# Patient Record
Sex: MALE | Race: WHITE | NOT HISPANIC OR LATINO | Employment: FULL TIME | URBAN - METROPOLITAN AREA
[De-identification: names, ages, dates, MRNs, and addresses within clinical notes are randomized per-mention and may not be internally consistent; named-entity substitution may affect disease eponyms.]

---

## 2018-10-11 ENCOUNTER — IMMUNIZATION (OUTPATIENT)
Dept: INTERNAL MEDICINE | Facility: CLINIC | Age: 38
End: 2018-10-11
Payer: COMMERCIAL

## 2018-10-11 ENCOUNTER — HOSPITAL ENCOUNTER (OUTPATIENT)
Dept: RADIOLOGY | Facility: HOSPITAL | Age: 38
Discharge: HOME | End: 2018-10-11
Attending: INTERNAL MEDICINE
Payer: COMMERCIAL

## 2018-10-11 ENCOUNTER — OFFICE VISIT (OUTPATIENT)
Dept: INTERNAL MEDICINE | Facility: CLINIC | Age: 38
End: 2018-10-11
Payer: COMMERCIAL

## 2018-10-11 VITALS
WEIGHT: 185.19 LBS | HEART RATE: 81 BPM | BODY MASS INDEX: 25.93 KG/M2 | HEIGHT: 71 IN | DIASTOLIC BLOOD PRESSURE: 82 MMHG | SYSTOLIC BLOOD PRESSURE: 118 MMHG

## 2018-10-11 DIAGNOSIS — E78.00 PURE HYPERCHOLESTEROLEMIA: ICD-10-CM

## 2018-10-11 DIAGNOSIS — Z00.00 ROUTINE MEDICAL EXAM: Primary | ICD-10-CM

## 2018-10-11 DIAGNOSIS — Z23 INFLUENZA VACCINE NEEDED: ICD-10-CM

## 2018-10-11 DIAGNOSIS — M54.2 NECK PAIN ON LEFT SIDE: ICD-10-CM

## 2018-10-11 LAB
BILIRUB UR QL STRIP: NEGATIVE
CLARITY UR REFRACT.AUTO: CLEAR
COLOR UR AUTO: YELLOW
GLUCOSE UR QL STRIP: NEGATIVE
HGB UR QL STRIP: NEGATIVE
KETONES UR QL STRIP: NEGATIVE
LEUKOCYTE ESTERASE UR QL STRIP: NEGATIVE
NITRITE UR QL STRIP: NEGATIVE
PH UR STRIP: 7 [PH] (ref 5–8)
PROT UR QL STRIP: NEGATIVE
SP GR UR STRIP: 1.01 (ref 1–1.03)
URN SPEC COLLECT METH UR: NORMAL
UROBILINOGEN UR STRIP-ACNC: NEGATIVE EU/DL

## 2018-10-11 PROCEDURE — 81003 URINALYSIS AUTO W/O SCOPE: CPT

## 2018-10-11 PROCEDURE — 93005 ELECTROCARDIOGRAM TRACING: CPT | Mod: S$GLB,,, | Performed by: INTERNAL MEDICINE

## 2018-10-11 PROCEDURE — 99999 PR PBB SHADOW E&M-NEW PATIENT-LVL III: CPT | Mod: PBBFAC,,, | Performed by: INTERNAL MEDICINE

## 2018-10-11 PROCEDURE — 93010 ELECTROCARDIOGRAM REPORT: CPT | Mod: S$GLB,,, | Performed by: INTERNAL MEDICINE

## 2018-10-11 PROCEDURE — 72040 X-RAY EXAM NECK SPINE 2-3 VW: CPT | Mod: 26,,, | Performed by: RADIOLOGY

## 2018-10-11 PROCEDURE — 72040 X-RAY EXAM NECK SPINE 2-3 VW: CPT | Mod: TC

## 2018-10-11 PROCEDURE — 90686 IIV4 VACC NO PRSV 0.5 ML IM: CPT | Mod: S$GLB,,, | Performed by: INTERNAL MEDICINE

## 2018-10-11 PROCEDURE — 99385 PREV VISIT NEW AGE 18-39: CPT | Mod: S$GLB,,, | Performed by: INTERNAL MEDICINE

## 2018-10-11 PROCEDURE — 90471 IMMUNIZATION ADMIN: CPT | Mod: S$GLB,,, | Performed by: INTERNAL MEDICINE

## 2018-10-12 NOTE — PROGRESS NOTES
Subjective:       Patient ID: Alexander Colmenares is a 37 y.o. male.    Chief Complaint: Establish Care    He is new to the clinic. No previous PCP in recent years. Presents to establish care, for a physical. Recent labs done at a screening detailed below.     PMH: Elevated Cholesterol, , previously 130's, with HDL >50; CBC and CMP normal August 2018   Eye exam 2016. Flu shot 2016. Tdap 2017.   PSH: [place] teeth extracted. Right forearm fracture as a child did not require surgery.   Social: Non-smoker. Alcohol - 1-2 beers most nights.  with 2 children.  in commercial litigation.   FMH: HTN, Chol in mother. HTN, Valvular heart defect and Bladder cancer in father. Heart dis and Stroke in grandparents.   NKDA.  Medications: None.         Review of Systems   Constitutional: Negative for activity change, appetite change, chills, fatigue, fever and unexpected weight change.        Gained about 10 pounds in past couple years. Regular diet, no formal exercise. Caffeine in one diet soda daily.   HENT: Negative for congestion, ear pain, hearing loss, postnasal drip, rhinorrhea, sore throat, trouble swallowing and voice change.    Eyes: Negative for pain and visual disturbance.   Respiratory: Negative for apnea, cough, chest tightness, shortness of breath and wheezing.    Cardiovascular: Negative for chest pain, palpitations and leg swelling.        Notices heart beat while lying in bed sometimes, but not palpitations in the sense of a rapid or irregular heart beat. Occasional sensation of tightness in the upper left pectoral muscle. No exertional chest discomfort while doing strenuous yard work.    Gastrointestinal: Negative for abdominal pain, blood in stool, constipation, diarrhea, nausea and vomiting.   Genitourinary: Negative for difficulty urinating, dysuria, frequency, hematuria, scrotal swelling and urgency.   Musculoskeletal: Positive for neck pain. Negative for arthralgias, back pain, gait  "problem, myalgias and neck stiffness.        Chronic intermittent pain on left side of neck, no trauma. The pain is worse when turning head to the left. No mass or swelling noted. No radicular symptoms described, but he does wake with left arm numbness sometimes.    Skin: Negative for color change and rash.   Neurological: Negative for dizziness, seizures, syncope, weakness, numbness and headaches.   Hematological: Negative for adenopathy. Does not bruise/bleed easily.   Psychiatric/Behavioral: Negative for agitation, dysphoric mood and sleep disturbance. The patient is not nervous/anxious.        Objective:    /82, Pulse 81, Ht 5' 11", Wt 185 lbs, BMI=25.8  Physical Exam   Constitutional: He is oriented to person, place, and time. He appears well-developed and well-nourished. No distress.   HENT:   Head: Normocephalic and atraumatic.   Right Ear: External ear normal.   Left Ear: External ear normal.   Nose: Nose normal.   Mouth/Throat: Oropharynx is clear and moist.   Eyes: Conjunctivae and EOM are normal. Pupils are equal, round, and reactive to light. No scleral icterus.   Neck: Normal range of motion. Neck supple. No JVD present. Carotid bruit is not present. No thyromegaly present.   Cardiovascular: Normal rate, regular rhythm, normal heart sounds and intact distal pulses. Exam reveals no gallop and no friction rub.   No murmur heard.  Pulmonary/Chest: Effort normal and breath sounds normal. No respiratory distress. He has no wheezes. He has no rales.   Abdominal: Soft. Bowel sounds are normal. He exhibits no distension and no mass. There is no tenderness.   Musculoskeletal: Normal range of motion. He exhibits no edema, tenderness or deformity.   No point tenderness along the spine.    Lymphadenopathy:     He has no cervical adenopathy.   Neurological: He is alert and oriented to person, place, and time. He has normal reflexes. No cranial nerve deficit. He exhibits normal muscle tone. Coordination normal. "   Skin: Skin is warm and dry. No rash noted. He is not diaphoretic.   Psychiatric: He has a normal mood and affect. His behavior is normal. Judgment and thought content normal.       EKG: NSR, 60 bpm, normal.    Assessment:       1. Routine medical exam    2. Pure hypercholesterolemia    3. Neck pain on left side    4. Influenza vaccine needed        Plan:       Routine medical exam  -     Urinalysis  -     IN OFFICE EKG 12-LEAD (to Muse) normal, non-cardiac chest pain - patient reassured.    Pure hypercholesterolemia        -     Counseled on diet and exercise for management, get back down to baseline of .    Neck pain on left side with possible mild radiculitis  -     X-Ray Cervical Spine AP And Lateral; Future; Expected date: 10/11/2018    Influenza vaccine needed - Flu shot today.

## 2018-10-23 ENCOUNTER — PATIENT MESSAGE (OUTPATIENT)
Dept: INTERNAL MEDICINE | Facility: CLINIC | Age: 38
End: 2018-10-23

## 2018-10-23 DIAGNOSIS — M54.2 CHRONIC NECK PAIN: ICD-10-CM

## 2018-10-23 DIAGNOSIS — G89.29 CHRONIC NECK PAIN: ICD-10-CM

## 2018-10-29 PROBLEM — M54.2 CHRONIC NECK PAIN: Status: ACTIVE | Noted: 2018-10-29

## 2018-10-29 PROBLEM — G89.29 CHRONIC NECK PAIN: Status: ACTIVE | Noted: 2018-10-29

## 2018-11-13 ENCOUNTER — CLINICAL SUPPORT (OUTPATIENT)
Dept: REHABILITATION | Facility: OTHER | Age: 38
End: 2018-11-13
Payer: COMMERCIAL

## 2018-11-13 DIAGNOSIS — M54.2 NECK PAIN ON LEFT SIDE: ICD-10-CM

## 2018-11-13 DIAGNOSIS — G89.29 CHRONIC NECK PAIN: ICD-10-CM

## 2018-11-13 DIAGNOSIS — M43.6 STIFFNESS OF CERVICAL SPINE: ICD-10-CM

## 2018-11-13 DIAGNOSIS — M54.2 CHRONIC NECK PAIN: ICD-10-CM

## 2018-11-13 DIAGNOSIS — R29.3 POOR POSTURE: ICD-10-CM

## 2018-11-13 PROCEDURE — 97161 PT EVAL LOW COMPLEX 20 MIN: CPT | Mod: PN | Performed by: PHYSICAL THERAPIST

## 2018-11-13 PROCEDURE — 97110 THERAPEUTIC EXERCISES: CPT | Mod: PN | Performed by: PHYSICAL THERAPIST

## 2018-11-13 NOTE — PLAN OF CARE
[facility]  Physical Therapy Initial Evaluation    Name: Alexander Colmenares  Clinic Number: [id number]    Therapy Diagnosis:   Encounter Diagnoses   Name Primary?    Poor posture     Stiffness of cervical spine      Physician: Zoie Driver, *    Physician Orders: PT Eval and Treat Chronic neck pain with normal spine x-rays.     Medical Diagnosis from Referral: Chronic neck pain [M54.2, G89.29]     Evaluation Date: 11/13/2018  Authorization Period Expiration: 12/31/2018  Plan of Care Expiration: 12/28/2018  Visit # / Visits authorized: 1/ 20    Time In: 0800  Time Out: 0900  Total Billable Time: 60 minutes    Precautions: Standard    Subjective   Date of onset: exacerbated past 2-3 months  History of current condition - Alexander reports: gradual onset of L sided neck pain, worse over the past 2-3 months. Pt works as an  and spends a lot of time at the computer. Pt reports that pain is sharp and localized most of the time, and as the day progress will become more diffuse and cause headache. Pain with turning L with driving.        Past Medical History:   Diagnosis Date    Hyperlipidemia      Alexander Colmenares  has a past surgical history that includes [place] tooth extraction.    Alexander currently has no medications in their medication list.    Review of patient's allergies indicates:  No Known Allergies     Imaging, X-ray 10/11/2018: FINDINGS:There is no displacement of the lateral masses with unremarkable C1-C2 spacing.  The dens is normal in configuration and appears intact.  The cervical vertebral bodies are normal in height and are in straight alignment on the lateral view with unremarkable appearance of the included disc spaces.  The prevertebral soft tissues are normal in thickness and the spinous processes are intact. IMPRESSION: Lack of lordotic curvature with otherwise unremarkable three-view appearance of the cervical spine.      Prior Therapy: none  Social  History: Pt lives with their family. Says he's cautious now when playing with his kids at home. Some difficulty sleeping, improved with new pillow  Occupation:   Prior Level of Function: Independent with all ADL's  Current Level of Function: Independent with ADL's, some limitation with turning head left while driving    Pain:  Current 2/10, worst 5/10, best 0/10   Location: left neck   Description: Aching, Dull and Sharp  Aggravating Factors: Turning head left (driving), prolonged computer use, cervical extension  Easing Factors: Advil (rarely takes), avoiding painful directions    Pts goals: Eliminating pain.     Objective     Posture: Decreased cervical lordosis    Cervical Range of Motion:    Degrees Pain   Flexion 40 End range tightness L     Extension 45 ERP L     Right Rotation 55      Left Rotation 50 ERP L     Right Side Bending 35 Tightness L   Left Side Bending 40 Tightness R      Shoulder Range of Motion:   Grossly WFL B. Slight c/o tightness end range elevation and combined ER R UE    Upper Extremity Strength  (R) UE  (L) UE    Shoulder flexion: 5/5 Shoulder flexion: 5/5   Shoulder Abduction: 5/5 Shoulder abduction: 5/5   Shoulder ER 4+/5 Shoulder ER 4+/5   Shoulder IR 5/5 Shoulder IR 5/5   Elbow flexion: 5/5 Elbow flexion: 5/5   Elbow extension: 5/5 Elbow extension: 5/5   Wrist flexion: 5/5 Wrist flexion: 5/5   Wrist extension: 5/5 Wrist extension: 5/5   Lower Trap 4-/5 Lower Trap 4-/5   Middle Trap 4/5 Middle Trap 4/5   Rhomboids 4/5 Rhomboids 4/5         Special Tests:      Joint Mobility: stiffness/tenderness/decreased mobility through mid C-spine     Palpation: trigger point tenderness to suboccipitals, B UT and LS (L>R)      Flexibility: Mod restrictions B to upper trap, levator scap          CMS Impairment/Limitation/Restriction for FOTO Neck Survey    Therapist reviewed FOTO scores for Alexander Colmenares on 11/13/2018.   FOTO documents entered into EPIC - see Media  "section.    Limitation Score: 43%  Category: Body Position    Current : CK = at least 40% but < 60% impaired, limited or restricted  Goal: CJ = at least 20% but < 40% impaired, limited or restricted  Discharge: n/a          TREATMENT   Treatment Time In: 8:45  Treatment Time Out: 9:00  Total Treatment time separate from Evaluation: 15 minutes    Alexander received therapeutic exercises to develop strength, endurance, ROM, flexibility, posture and core stabilization for 15 minutes including:  Supine chin tucks 5" hold x 10  Upper trap stretch 3 x 30"  Levator scap stretch 3 x 30"  Pec corner stretch 3 x 30"    Home Exercises and Patient Education Provided    Education provided:   - Therapy rationale and plan of care.    Written Home Exercises Provided: yes.  Exercises were reviewed and Alexander was able to demonstrate them prior to the end of the session.  Alexander demonstrated good  understanding of the education provided.     See EMR under Patient Instructions for exercises provided 11/13/2018.    Assessment   Alexander is a 37 y.o. male referred to outpatient Physical Therapy with a medical diagnosis of chronic neck pain. Pt presents with s/s consistent with diagnosis. Decreased cervical lordosis with sitting and standing posture, consistent with imaging report. Forward head/rounded shoulder posture in sitting and standing, consistent with findings of MMT with periscapular weakness. Stiffness and tenderness through mid-cervical vertebrae with CPA and lateral glides. Localized discomfort with quadrant testing consistent with facet arthropathy. Trigger point tenderness to suboccipitals, PVM, upper trap, and levator scap.      Pt prognosis is Good.   Pt will benefit from skilled outpatient Physical Therapy to address the deficits stated above and in the chart below, provide pt/family education, and to maximize pt's level of independence.     Plan of care discussed with patient: Yes  Pt's spiritual, cultural and educational " needs considered and patient is agreeable to the plan of care and goals as stated below:     Anticipated Barriers for therapy: none    Medical Necessity is demonstrated by the following  History  Co-morbidities and personal factors that may impact the plan of care Co-morbidities:   none reported    Personal Factors:   no deficits     low   Examination  Body Structures and Functions, activity limitations and participation restrictions that may impact the plan of care Body Regions:   neck  upper extremities    Body Systems:    ROM  strength    Participation Restrictions:   No deficits    Activity limitations:   Learning and applying knowledge  no deficits    General Tasks and Commands  no deficits    Communication  no deficits    Mobility  driving (bike, car, motorcycle)    Self care  no deficits    Domestic Life  no deficits    Interactions/Relationships  no deficits    Life Areas  no deficits    Community and Social Life  no deficits         low   Clinical Presentation stable and uncomplicated low   Decision Making/ Complexity Score: low     Goals:  Short Term Goals (4 Weeks):   1. Pt will report 20% reduction in pain of the cervical spine for ease with computer work  2. PT will demonstrate 1/3 MMT improvement in periscapular strength for ease with upright posture  3. Pt will demonstrate improved cervical spine ROM in all directions by 5 degrees for ease with driving  Long Term Goals (8 Weeks):   1. Pt will report being independent with HEP for maintenance of improvements gained during therapy sessions  2. PT will report 50% reduction of pain of the neck and LUE for ease with tasks at work and in community  3. Pt will demonstrate full BUE and periscapular strength without the provocation of pain for ease with caring for playing with his children.  4. Pt will demonstrate appropriate upright posture without external cueing for ease with computer work.       Plan   Plan of care Certification: 11/13/2018 to  12/28/2018.    Outpatient Physical Therapy 2 times weekly for 6 weeks to include the following interventions: Cervical/Lumbar Traction, Manual Therapy, Moist Heat/ Ice, Patient Education, Therapeutic Exercise and Dry Needling.     More Asencio, PT

## 2018-11-27 ENCOUNTER — CLINICAL SUPPORT (OUTPATIENT)
Dept: REHABILITATION | Facility: OTHER | Age: 38
End: 2018-11-27
Payer: COMMERCIAL

## 2018-11-27 DIAGNOSIS — M43.6 STIFFNESS OF CERVICAL SPINE: ICD-10-CM

## 2018-11-27 DIAGNOSIS — R29.3 POOR POSTURE: ICD-10-CM

## 2018-11-27 DIAGNOSIS — M54.2 NECK PAIN ON LEFT SIDE: ICD-10-CM

## 2018-11-27 PROCEDURE — 97110 THERAPEUTIC EXERCISES: CPT | Mod: PN | Performed by: PHYSICAL THERAPIST

## 2018-11-27 PROCEDURE — 97140 MANUAL THERAPY 1/> REGIONS: CPT | Mod: PN | Performed by: PHYSICAL THERAPIST

## 2018-11-27 NOTE — PROGRESS NOTES
"  Physical Therapy Daily Treatment Note     Name: Alexander Hassanbill  Clinic Number: [id number]    Therapy Diagnosis:   Encounter Diagnoses   Name Primary?    Poor posture     Stiffness of cervical spine     Neck pain on left side      Physician: Zoie Driver, *    Visit Date: 11/27/2018    Physician Orders: PT Eval and Treat Chronic neck pain with normal spine x-rays.    Medical Diagnosis from Referral: Chronic neck pain [M54.2, G89.29]   Evaluation Date: 11/13/2018  Authorization Period Expiration: 12/31/2018  Plan of Care Expiration: 12/28/2018  Visit # / Visits authorized: 2/ 20          Time In: 8:00 AM  Time Out: 9:00 AM  Total Billable Time: 60 minutes    Precautions: Standard    Subjective     Pt reports: Feeling a little better today. "I'm not sure if it's the stretches, or being away from the office for the last week."  He was compliant with home exercise program.  Response to previous treatment: some improvement with stretching  Functional change: none    Pain: 2/10  Location: left neck      Objective     Alexander received therapeutic exercises to develop strength, endurance, ROM, flexibility, posture and core stabilization for 40 minutes including:  Stretch Repetitions Duration    UT 3 30"    LS 3 30"    Pec 1/2 foam roll 1 2 min              Isotonics Resistance Sets x Repetitions    Prone Y Deferred     Prone T Deferred     Prone A Deferred     1/2 foam supine punch  2 x 10    1/2 foam horiz abd/add  2 x 10    1/2 foam flex/ext  2 x 10                          Isometrics Hold Sets x Repeititions    Seated chin tucks Deferred      Supine DNF hold 10" 5x                    Theraband Resistance Sets x Repetitions    Rows  Deferred     Flashers Deferred     Shoulder ext with ER Deferred     Lat pull down Deferred         Alexander received the following manual therapy techniques: Joint mobilizations, Manual traction, Myofacial release, Soft tissue Mobilization and Dry Needling were applied to the: " neck for 20 minutes, including:  Application of TDN: Pt educated on benefits and potential side effects of dry needling. Educated pt on benefits, precautions, side effects followign TDN. Educated pt to use heat following treatment sessions if pt is experiencing pain or soreness. Pt verbalized good understanding of education.  Pt signed written consent to dry needling. Pt gave verbal consent for DN    Pt received dry needling to the below listed muscles using 30 mm needles. Winding performed every 5 minutes during treatment.  L: UT, C 3 and 4 PVM, scalenes, suboccipitals        Home Exercises Provided and Patient Education Provided     Education provided:   - Pt educated on possible side effects with dry needling, advised to use heat or ice as needed for soreness.    Written Home Exercises Provided: Patient instructed to cont prior HEP.  Exercises were reviewed and Alexander was able to demonstrate them prior to the end of the session.  Alexander demonstrated good  understanding of the education provided.     See EMR under Patient Instructions for exercises provided prior visit.    Assessment     Good tolerance to initial tx session. Min c/o difficulty with DNF exercise. Verbal and tactile cuing for technique with new exercises. Good soft tissue response to dry needling evident by increased grasp with unilateral winding at all insertion points. Winding performed every 5 minutes during treatment. No adverse effects following treatment.  Alexander is progressing well towards his goals.   Pt prognosis is Good.     Pt will continue to benefit from skilled outpatient physical therapy to address the deficits listed in the problem list box on initial evaluation, provide pt/family education and to maximize pt's level of independence in the home and community environment.     Pt's spiritual, cultural and educational needs considered and pt agreeable to plan of care and goals.    Anticipated barriers to physical therapy: none    Goals:    Short Term Goals (4 Weeks):   1. Pt will report 20% reduction in pain of the cervical spine for ease with computer work Progressing, not met  2. PT will demonstrate 1/3 MMT improvement in periscapular strength for ease with upright posture Progressing, not met  3. Pt will demonstrate improved cervical spine ROM in all directions by 5 degrees for ease with driving Progressing, not met  Long Term Goals (8 Weeks):   1. Pt will report being independent with HEP for maintenance of improvements gained during therapy sessions Progressing, not met  2. PT will report 50% reduction of pain of the neck and LUE for ease with tasks at work and in community Progressing, not met  3. Pt will demonstrate full BUE and periscapular strength without the provocation of pain for ease with caring for playing with his children. Progressing, not met  4. Pt will demonstrate appropriate upright posture without external cueing for ease with computer work.  Progressing, not met        Plan     Continue per plan of care with focus on scapular strengthening and postural improvements.   Plan of care Certification: 11/13/2018 to 12/28/2018.        More Asencio, PT

## 2018-11-28 NOTE — PROGRESS NOTES
"  Physical Therapy Daily Treatment Note     Name: Alexander Hassanbill  Clinic Number: [id number]    Therapy Diagnosis:   Encounter Diagnoses   Name Primary?    Poor posture     Stiffness of cervical spine     Neck pain on left side      Physician: Zoie Driver, *    Visit Date: 11/29/2018    Physician Orders: PT Eval and Treat Chronic neck pain with normal spine x-rays.    Medical Diagnosis from Referral: Chronic neck pain [M54.2, G89.29]   Evaluation Date: 11/13/2018  Authorization Period Expiration: 12/31/2018  Plan of Care Expiration: 12/28/2018  Visit # / Visits authorized: 3/ 20          Time In: 8:00 AM  Time Out: 9:00 AM  Total Billable Time: 60 minutes    Precautions: Standard    Subjective     Pt reports: feeling a little better today. He says Wednesday he noticed pain was more diffuse. At work he has been trying to be more mindful of posture and get up from desk every hour.  He was compliant with home exercise program.  Response to previous treatment: decreased pain after dry needling.  Functional change: none    Pain: 2/10   Location: left neck      Objective     Alexander received therapeutic exercises to develop strength, endurance, ROM, flexibility, posture and core stabilization for 45 minutes including:  UBE 2' fwd/2' bwd (collected history, assessed pt complaints, education on causes of cervicogenic HA; importance of exercise)    Stretch Repetitions Duration    UT 3 30"    LS 3 30"    Pec 1/2 foam roll 1 2 min              Isotonics Resistance Sets x Repetitions    Prone Y 3" hold 2 x 10    Prone T 3" hold 2 x 10    Prone A 3" hold 2 x 10    1/2 foam supine punch 2# 2 x 10    1/2 foam horiz abd/add 2# 2 x 10    1/2 foam flex/ext 2# 2 x 10                          Isometrics Hold Sets x Repeititions    Seated chin tucks Deferred      Supine DNF hold 10" 10x                    Theraband Resistance Sets x Repetitions    Rows  Deferred     Flashers Deferred     Shoulder ext with ER Deferred   "   Lat pull down Deferred         Alexander received the following manual therapy techniques: Joint mobilizations, Manual traction, Myofacial release, Soft tissue Mobilization and Dry Needling were applied to the: neck for 15 minutes, including:    Application of TDN: Pt educated on benefits and potential side effects of dry needling. Educated pt on benefits, precautions, side effects followign TDN. Educated pt to use heat following treatment sessions if pt is experiencing pain or soreness. Pt verbalized good understanding of education.  Pt signed written consent to dry needling. Pt gave verbal consent for DN    Pt received dry needling to the below listed muscles using 30 mm needles. Winding performed every 5 minutes during treatment.  L: UT, LS, C 4 PVM, scalenes, suboccipitals        Home Exercises Provided and Patient Education Provided     Education provided:   - Pt educated on possible side effects with dry needling, advised to use heat or ice as needed for soreness.    Written Home Exercises Provided: Patient instructed to cont prior HEP.  Exercises were reviewed and Alexander was able to demonstrate them prior to the end of the session.  Alexander demonstrated good  understanding of the education provided.     See EMR under Patient Instructions for exercises provided prior visit.    Assessment     Good tolerance to therex today. Mild c/o difficulty with prone isotonics. Verbal and tactile cuing for technique with new therex. Good soft tissue response to dry needling evident by increased grasp with unilateral winding at all insertion points. Particular tightness noted at scalenes and suboccipitals. Winding performed every 5 minutes during treatment. No adverse effects following treatment.  Alexander is progressing well towards his goals.   Pt prognosis is Good.     Pt will continue to benefit from skilled outpatient physical therapy to address the deficits listed in the problem list box on initial evaluation, provide  pt/family education and to maximize pt's level of independence in the home and community environment.     Pt's spiritual, cultural and educational needs considered and pt agreeable to plan of care and goals.    Anticipated barriers to physical therapy: none    Goals:   Short Term Goals (4 Weeks):   1. Pt will report 20% reduction in pain of the cervical spine for ease with computer work Progressing, not met  2. PT will demonstrate 1/3 MMT improvement in periscapular strength for ease with upright posture Progressing, not met  3. Pt will demonstrate improved cervical spine ROM in all directions by 5 degrees for ease with driving Progressing, not met  Long Term Goals (8 Weeks):   1. Pt will report being independent with HEP for maintenance of improvements gained during therapy sessions Progressing, not met  2. PT will report 50% reduction of pain of the neck and LUE for ease with tasks at work and in community Progressing, not met  3. Pt will demonstrate full BUE and periscapular strength without the provocation of pain for ease with caring for playing with his children. Progressing, not met  4. Pt will demonstrate appropriate upright posture without external cueing for ease with computer work.  Progressing, not met        Plan     Continue per plan of care with focus on scapular strengthening and postural improvements.     Plan of care Certification: 11/13/2018 to 12/28/2018.      More Asencio, PT

## 2018-11-29 ENCOUNTER — CLINICAL SUPPORT (OUTPATIENT)
Dept: REHABILITATION | Facility: OTHER | Age: 38
End: 2018-11-29
Payer: COMMERCIAL

## 2018-11-29 DIAGNOSIS — M54.2 NECK PAIN ON LEFT SIDE: ICD-10-CM

## 2018-11-29 DIAGNOSIS — R29.3 POOR POSTURE: ICD-10-CM

## 2018-11-29 DIAGNOSIS — M43.6 STIFFNESS OF CERVICAL SPINE: ICD-10-CM

## 2018-11-29 PROCEDURE — 97140 MANUAL THERAPY 1/> REGIONS: CPT | Mod: PN | Performed by: PHYSICAL THERAPIST

## 2018-11-29 PROCEDURE — 97110 THERAPEUTIC EXERCISES: CPT | Mod: PN | Performed by: PHYSICAL THERAPIST

## 2018-12-06 ENCOUNTER — CLINICAL SUPPORT (OUTPATIENT)
Dept: REHABILITATION | Facility: OTHER | Age: 38
End: 2018-12-06
Payer: COMMERCIAL

## 2018-12-06 DIAGNOSIS — M43.6 STIFFNESS OF CERVICAL SPINE: ICD-10-CM

## 2018-12-06 DIAGNOSIS — R29.3 POOR POSTURE: ICD-10-CM

## 2018-12-06 DIAGNOSIS — M54.2 NECK PAIN ON LEFT SIDE: ICD-10-CM

## 2018-12-06 PROCEDURE — 97110 THERAPEUTIC EXERCISES: CPT | Mod: PN | Performed by: PHYSICAL THERAPIST

## 2018-12-06 PROCEDURE — 97140 MANUAL THERAPY 1/> REGIONS: CPT | Mod: PN | Performed by: PHYSICAL THERAPIST

## 2018-12-06 NOTE — PROGRESS NOTES
"  Physical Therapy Daily Treatment Note     Name: Alexander Hassanbill  Clinic Number: [id number]    Therapy Diagnosis:   Encounter Diagnoses   Name Primary?    Poor posture     Stiffness of cervical spine     Neck pain on left side      Physician: Zoie Driver, *    Visit Date: 12/6/2018    Physician Orders: PT Eval and Treat Chronic neck pain with normal spine x-rays.    Medical Diagnosis from Referral: Chronic neck pain [M54.2, G89.29]   Evaluation Date: 11/13/2018  Authorization Period Expiration: 12/31/2018  Plan of Care Expiration: 12/28/2018  Visit # / Visits authorized: 4/ 20          Time In: 8:00 AM  Time Out: 9:00 AM  Total Billable Time: 60 minutes    Precautions: Standard    Subjective     Pt reports: feeling better. Still has pain at end range rotation, but feels pain is steadily improving.  He was compliant with home exercise program.  Response to previous treatment: decreased pain after dry needling.  Functional change: none    Pain: 2/10 "1 1/2 or 2"  Location: left neck      Objective     Alexander received therapeutic exercises to develop strength, endurance, ROM, flexibility, posture and core stabilization for 45 minutes including:    UBE 2' fwd/2' bwd (collected history, assessed pt complaints, education on causes of cervicogenic HA; importance of exercise)    Stretch Repetitions Duration    UT 3 30"    LS 3 30"    Pec 1/2 foam roll 1 2 min              Isotonics Resistance Sets x Repetitions    Prone Y 3" hold 2 x 10    Prone T 3" hold 2 x 10    Prone A 3" hold 2 x 10    1/2 foam supine punch 2# 3 x 10    1/2 foam horiz abd/add 2# 3 x 10    1/2 foam flex/ext 2# 3 x 10                          Isometrics Hold Sets x Repeititions    Seated chin tucks Deferred      Supine DNF hold 10" 10x NP                   Theraband Resistance Sets x Repetitions    Rows  Deferred     Flashers Deferred     Shoulder ext with ER Deferred     Lat pull down Deferred         Alexander received the following " manual therapy techniques: Joint mobilizations, Manual traction, Myofacial release, Soft tissue Mobilization and Dry Needling were applied to the: neck for 15 minutes, including:    Application of TDN: Pt educated on benefits and potential side effects of dry needling. Educated pt on benefits, precautions, side effects followign TDN. Educated pt to use heat following treatment sessions if pt is experiencing pain or soreness. Pt verbalized good understanding of education.  Pt signed written consent to dry needling. Pt gave verbal consent for DN    Pt received dry needling to the below listed muscles using 30 mm needles. Winding performed every 5 minutes during treatment.  L: UT, LS, C 4 PVM, scalenes, suboccipitals        Home Exercises Provided and Patient Education Provided     Education provided:   - Pt encouraged to use timer to check in on posture throughout his work day. Instructed on new therex for HEP.    Written Home Exercises Provided: yes.  Exercises were reviewed and Alexander was able to demonstrate them prior to the end of the session.  Alexander demonstrated good  understanding of the education provided.     See EMR under Patient Instructions for exercises provided 12/6/2018.    Assessment     Good tolerance to therex today. Increased to 3 sets with 1/2 foam roll exercises. Mild c/o difficulty with prone upper/middle/lower trap exercises, but able to perform. Good soft tissue response to dry needling evident by increased grasp with unilateral winding at all insertion points. Winding performed every 5 minutes during treatment. No adverse effects following treatment.  Alexander is progressing well towards his goals.   Pt prognosis is Good.     Pt will continue to benefit from skilled outpatient physical therapy to address the deficits listed in the problem list box on initial evaluation, provide pt/family education and to maximize pt's level of independence in the home and community environment.     Pt's  spiritual, cultural and educational needs considered and pt agreeable to plan of care and goals.    Anticipated barriers to physical therapy: none    Goals: IE 11/13/2018, PN due 12/13/2018  Short Term Goals (4 Weeks):   1. Pt will report 20% reduction in pain of the cervical spine for ease with computer work Progressing, not met  2. PT will demonstrate 1/3 MMT improvement in periscapular strength for ease with upright posture Progressing, not met  3. Pt will demonstrate improved cervical spine ROM in all directions by 5 degrees for ease with driving Progressing, not met  Long Term Goals (8 Weeks):   1. Pt will report being independent with HEP for maintenance of improvements gained during therapy sessions Progressing, not met  2. PT will report 50% reduction of pain of the neck and LUE for ease with tasks at work and in community Progressing, not met  3. Pt will demonstrate full BUE and periscapular strength without the provocation of pain for ease with caring for playing with his children. Progressing, not met  4. Pt will demonstrate appropriate upright posture without external cueing for ease with computer work.  Progressing, not met        Plan     Continue per plan of care with focus on scapular strengthening and postural improvements.     Plan of care Certification: 11/13/2018 to 12/28/2018.      More Asencio, PT

## 2018-12-11 ENCOUNTER — CLINICAL SUPPORT (OUTPATIENT)
Dept: REHABILITATION | Facility: OTHER | Age: 38
End: 2018-12-11
Payer: COMMERCIAL

## 2018-12-11 DIAGNOSIS — M43.6 STIFFNESS OF CERVICAL SPINE: ICD-10-CM

## 2018-12-11 DIAGNOSIS — M54.2 NECK PAIN ON LEFT SIDE: ICD-10-CM

## 2018-12-11 DIAGNOSIS — R29.3 POOR POSTURE: ICD-10-CM

## 2018-12-11 PROCEDURE — 97110 THERAPEUTIC EXERCISES: CPT | Mod: PN | Performed by: PHYSICAL THERAPIST

## 2018-12-11 PROCEDURE — 97140 MANUAL THERAPY 1/> REGIONS: CPT | Mod: PN | Performed by: PHYSICAL THERAPIST

## 2018-12-11 NOTE — PROGRESS NOTES
"  Physical Therapy Daily Treatment Note     Name: Alexander Hassanbill  Clinic Number: [id number]    Therapy Diagnosis:   Encounter Diagnoses   Name Primary?    Poor posture     Stiffness of cervical spine     Neck pain on left side      Physician: Zoie Driver, *    Visit Date: 12/11/2018    Physician Orders: PT Eval and Treat Chronic neck pain with normal spine x-rays.    Medical Diagnosis from Referral: Chronic neck pain [M54.2, G89.29]   Evaluation Date: 11/13/2018  Authorization Period Expiration: 12/31/2018  Plan of Care Expiration: 12/28/2018  Visit # / Visits authorized: 5/ 20       Time In: 8:00 AM  Time Out: 8:50 AM  Total Billable Time: 50 minutes    Precautions: Standard    Subjective     Pt reports: pain continues to gradually improved. He says he had to type for a long time yesterday, but set timer on his phone to go off every hour so he either adjusted posture or got home and walked around for a few minutes.   He was compliant with home exercise program. Requests to leave 10 minutes early today.  Response to previous treatment: decreased pain after dry needling.  Functional change: none    Pain: 2/10 "1 1/2 or 2"  Location: left neck      Objective     Alexander received therapeutic exercises to develop strength, endurance, ROM, flexibility, posture and core stabilization for 35 minutes including:    UBE 2' fwd/2' bwd (collected history, assessed pt complaints, education on causes of cervicogenic HA; importance of exercise) - NP today    Stretch Repetitions Duration    UT 3 30"    LS 3 30"    Pec 1/2 foam roll 1 2 min              Isotonics Resistance Sets x Repetitions    Prone Y 3" hold 2 x 10 Add 1# next visit   Prone T 3" hold 2 x 10    Prone A 3" hold 2 x 10    1/2 foam supine punch 2# 2 x 15    1/2 foam horiz abd/add 2# 2 x 15    1/2 foam flex/ext 2# 2 x 15                          Isometrics Hold Sets x Repeititions    Seated chin tucks Deferred      Supine DNF hold 10" 10x NP           "         Theraband Resistance Sets x Repetitions    Rows  Deferred     Flashers Deferred     Shoulder ext with ER Deferred     Lat pull down Deferred         Alexander received the following manual therapy techniques: Joint mobilizations, Manual traction, Myofacial release, Soft tissue Mobilization and Dry Needling were applied to the: neck for 15 minutes, including:    Application of TDN: Pt educated on benefits and potential side effects of dry needling. Educated pt on benefits, precautions, side effects followign TDN. Educated pt to use heat following treatment sessions if pt is experiencing pain or soreness. Pt verbalized good understanding of education.  Pt signed written consent to dry needling. Pt gave verbal consent for DN    Pt received dry needling to the below listed muscles using 30 mm needles. Winding performed every 5 minutes during treatment.  L: UT, LS, C 4 PVM, scalenes, suboccipitals; R: LS        Home Exercises Provided and Patient Education Provided     Education provided:   - Pt encouraged to continue to use timer to check in on posture throughout his work day.     Written Home Exercises Provided: yes.  Exercises were reviewed and Alexander was able to demonstrate them prior to the end of the session.  Alexander demonstrated good  understanding of the education provided.     See EMR under Patient Instructions for exercises provided 12/6/2018.    Assessment     Good tolerance to all therex today. Minimal verbal cuing for set-up with isotonics. Pt continues to report mild difficulty/mm fatigue with prone exercises. Good soft tissue response to dry needling evident by increased grasp with unilateral winding at all insertion points. Winding performed every 5 minutes during treatment. No adverse effects following treatment.  Alexander is progressing well towards his goals.   Pt prognosis is Good.     Pt will continue to benefit from skilled outpatient physical therapy to address the deficits listed in the problem  list box on initial evaluation, provide pt/family education and to maximize pt's level of independence in the home and community environment.     Pt's spiritual, cultural and educational needs considered and pt agreeable to plan of care and goals.    Anticipated barriers to physical therapy: none    Goals: IE 11/13/2018, PN due 12/13/2018  Short Term Goals (4 Weeks):   1. Pt will report 20% reduction in pain of the cervical spine for ease with computer work Progressing, not met  2. PT will demonstrate 1/3 MMT improvement in periscapular strength for ease with upright posture Progressing, not met  3. Pt will demonstrate improved cervical spine ROM in all directions by 5 degrees for ease with driving Progressing, not met  Long Term Goals (8 Weeks):   1. Pt will report being independent with HEP for maintenance of improvements gained during therapy sessions Progressing, not met  2. PT will report 50% reduction of pain of the neck and LUE for ease with tasks at work and in community Progressing, not met  3. Pt will demonstrate full BUE and periscapular strength without the provocation of pain for ease with caring for playing with his children. Progressing, not met  4. Pt will demonstrate appropriate upright posture without external cueing for ease with computer work.  Progressing, not met        Plan     Continue per plan of care with focus on scapular strengthening and postural improvements.     Plan of care Certification: 11/13/2018 to 12/28/2018.      More Asencio, PT

## 2018-12-13 NOTE — PROGRESS NOTES
"  Physical Therapy Daily Treatment Note     Name: Alexander Colmenares  Clinic Number: [id number]    Therapy Diagnosis:   Encounter Diagnoses   Name Primary?    Poor posture     Stiffness of cervical spine     Neck pain on left side      Physician: Zoie Driver, *    Visit Date: 12/14/2018    Physician Orders: PT Eval and Treat Chronic neck pain with normal spine x-rays.    Medical Diagnosis from Referral: Chronic neck pain [M54.2, G89.29]   Evaluation Date: 11/13/2018  Authorization Period Expiration: 12/31/2018  Plan of Care Expiration: 12/28/2018  Visit # / Visits authorized: 6/ 20       Time In: 9:05 AM  Time Out: 10:10 AM  Total Billable Time: 65 minutes    Precautions: Standard    Subjective     Pt reports: continues to feel gradually better. Pain is no longer "that tight ball" but is more diffuse. Reports mild pain to R low neck today   He was compliant with home exercise program. Requests to leave 10 minutes early today.  Response to previous treatment: decreased pain after dry needling.  Functional change: none    Pain: 2/10 "1 1/2 or 2"  Location: left neck      Objective     Alexander received therapeutic exercises to develop strength, endurance, ROM, flexibility, posture and core stabilization for 50 minutes including:    UBE 2' fwd/2' bwd (collected history, assessed pt complaints, education on causes of cervicogenic HA; importance of exercise) - NP today    Stretch Repetitions Duration    UT 3 30"    LS 3 30"    Pec 1/2 foam roll 1 2 min              Isotonics Resistance Sets x Repetitions    Prone Y 1# 2 x 10    Prone T 1# 2 x 10    Prone A 1# 2 x 10    1/2 foam supine punch 3# 2 x 15    1/2 foam horiz abd/add 3# 2 x 15    1/2 foam flex/ext 3# 2 x 15                          Isometrics Hold Sets x Repeititions    Seated chin tucks Deferred      Supine DNF hold 10" 10x                    Theraband Resistance Sets x Repetitions    Rows  Deferred     Flashers Deferred     Shoulder ext with ER " Deferred     Lat pull down Deferred         Alexander received the following manual therapy techniques: Joint mobilizations, Manual traction, Myofacial release, Soft tissue Mobilization and Dry Needling were applied to the: neck for 15 minutes, including:    Application of TDN: Pt educated on benefits and potential side effects of dry needling. Educated pt on benefits, precautions, side effects followign TDN. Educated pt to use heat following treatment sessions if pt is experiencing pain or soreness. Pt verbalized good understanding of education.  Pt signed written consent to dry needling. Pt gave verbal consent for DN    Pt received dry needling to the below listed muscles using 30 mm needles. Winding performed every 5 minutes during treatment.  L: UT, LS, C 4 PVM, scalenes, suboccipitals; R: C7        Home Exercises Provided and Patient Education Provided     Education provided:   - Pt encouraged to continue to use timer to check in on posture throughout his work day.     Written Home Exercises Provided: yes.  Exercises were reviewed and Alexander was able to demonstrate them prior to the end of the session.  Alexander demonstrated good  understanding of the education provided.     See EMR under Patient Instructions for exercises provided 12/6/2018.    Assessment     Good tolerance to all therex today. Weight increased with supine isotonics, and weight added to prone isotonics as noted. Mild c/o difficulty/mm fatigue, but able to complete. Good soft tissue response to dry needling evident by increased grasp with unilateral winding at all insertion points. Point added to R C7 per pt report of pain to R side today, with strong grasp noted consistent with report of pain with functional activities. Winding performed every 5 minutes during treatment. No adverse effects following treatment.  Alexander is progressing well towards his goals.   Pt prognosis is Good.     Pt will continue to benefit from skilled outpatient physical  therapy to address the deficits listed in the problem list box on initial evaluation, provide pt/family education and to maximize pt's level of independence in the home and community environment.     Pt's spiritual, cultural and educational needs considered and pt agreeable to plan of care and goals.    Anticipated barriers to physical therapy: none    Goals: IE 11/13/2018, PN due 12/13/2018  Short Term Goals (4 Weeks):   1. Pt will report 20% reduction in pain of the cervical spine for ease with computer work Progressing, not met  2. PT will demonstrate 1/3 MMT improvement in periscapular strength for ease with upright posture Progressing, not met  3. Pt will demonstrate improved cervical spine ROM in all directions by 5 degrees for ease with driving Progressing, not met  Long Term Goals (8 Weeks):   1. Pt will report being independent with HEP for maintenance of improvements gained during therapy sessions Progressing, not met  2. PT will report 50% reduction of pain of the neck and LUE for ease with tasks at work and in community Progressing, not met  3. Pt will demonstrate full BUE and periscapular strength without the provocation of pain for ease with caring for playing with his children. Progressing, not met  4. Pt will demonstrate appropriate upright posture without external cueing for ease with computer work.  Progressing, not met        Plan     Continue per plan of care with focus on scapular strengthening and postural improvements.     Plan of care Certification: 11/13/2018 to 12/28/2018.      More Asencio, PT

## 2018-12-14 ENCOUNTER — CLINICAL SUPPORT (OUTPATIENT)
Dept: REHABILITATION | Facility: OTHER | Age: 38
End: 2018-12-14
Payer: COMMERCIAL

## 2018-12-14 DIAGNOSIS — M43.6 STIFFNESS OF CERVICAL SPINE: ICD-10-CM

## 2018-12-14 DIAGNOSIS — M54.2 NECK PAIN ON LEFT SIDE: ICD-10-CM

## 2018-12-14 DIAGNOSIS — R29.3 POOR POSTURE: ICD-10-CM

## 2018-12-14 PROCEDURE — 97140 MANUAL THERAPY 1/> REGIONS: CPT | Mod: PN | Performed by: PHYSICAL THERAPIST

## 2018-12-14 PROCEDURE — 97110 THERAPEUTIC EXERCISES: CPT | Mod: PN | Performed by: PHYSICAL THERAPIST

## 2019-01-03 ENCOUNTER — CLINICAL SUPPORT (OUTPATIENT)
Dept: REHABILITATION | Facility: OTHER | Age: 39
End: 2019-01-03
Payer: COMMERCIAL

## 2019-01-03 DIAGNOSIS — M43.6 STIFFNESS OF CERVICAL SPINE: ICD-10-CM

## 2019-01-03 DIAGNOSIS — M54.2 NECK PAIN ON LEFT SIDE: ICD-10-CM

## 2019-01-03 DIAGNOSIS — R29.3 POOR POSTURE: ICD-10-CM

## 2019-01-03 PROCEDURE — 97140 MANUAL THERAPY 1/> REGIONS: CPT | Mod: PN | Performed by: PHYSICAL THERAPIST

## 2019-01-03 PROCEDURE — 97110 THERAPEUTIC EXERCISES: CPT | Mod: PN | Performed by: PHYSICAL THERAPIST

## 2019-01-03 NOTE — PROGRESS NOTES
"  Physical Therapy Daily Treatment Note     Name: Alexander Hassanbill  Clinic Number: [id number]    Therapy Diagnosis:   Encounter Diagnoses   Name Primary?    Poor posture     Stiffness of cervical spine     Neck pain on left side      Physician: Zoie Driver, *    Visit Date: 1/3/2019    Physician Orders: PT Eval and Treat Chronic neck pain with normal spine x-rays.    Medical Diagnosis from Referral: Chronic neck pain [M54.2, G89.29]   Evaluation Date: 11/13/2018  Authorization Period Expiration: 12/31/2018  Plan of Care Expiration: 12/28/2018  Visit # / Visits authorized: 7/ 20       Time In: 8:00 AM  Time Out: 9:00 AM  Total Billable Time: 60 minutes    Precautions: Standard    Subjective     Pt reports: mild stiffness to L C-spine, but overall continuing to improve   He was compliant with home exercise program.   Response to previous treatment: decreased pain after dry needling.  Functional change: none    Pain: 2/10 "1 1/2 or 2"  Location: left neck      Objective     Alexander received therapeutic exercises to develop strength, endurance, ROM, flexibility, posture and core stabilization for 45 minutes including:    UBE 3' fwd/3' bwd (collected history, assessed pt complaints, education on causes of cervicogenic HA; importance of exercise)     Stretch Repetitions Duration    UT 3 30" NP   LS 3 30" NP   Pec 1/2 foam roll 1 2 min NP             Isotonics Resistance Sets x Repetitions    Prone Y 1# 1 x 15    Prone T 1# 1 x 15    Prone A 1# 1 x 15    1/2 foam supine punch 3# 2 x 15    1/2 foam horiz abd/add 3# 2 x 15    1/2 foam flex/ext 3# 2 x 15                          Isometrics Hold Sets x Repeititions    Seated chin tucks Deferred      Supine DNF hold 10" 10x                    Theraband Resistance Sets x Repetitions    Rows  Deferred     Flashers Deferred     Shoulder ext with ER Deferred     Lat pull down Deferred         Alexander received the following manual therapy techniques: Joint " mobilizations, Manual traction, Myofacial release, Soft tissue Mobilization and Dry Needling were applied to the: neck for 15 minutes, including:    Application of TDN: Pt educated on benefits and potential side effects of dry needling. Educated pt on benefits, precautions, side effects followign TDN. Educated pt to use heat following treatment sessions if pt is experiencing pain or soreness. Pt verbalized good understanding of education.  Pt signed written consent to dry needling. Pt gave verbal consent for DN    Pt received dry needling to the below listed muscles using 30 mm needles. Winding performed every 5 minutes during treatment.  L: UT,  scalenes        Home Exercises Provided and Patient Education Provided     Education provided:   - Pt encouraged to continue to use timer to check in on posture throughout his work day.     Written Home Exercises Provided: yes.  Exercises were reviewed and Alexander was able to demonstrate them prior to the end of the session.  Alexander demonstrated good  understanding of the education provided.     See EMR under Patient Instructions for exercises provided 12/6/2018.    Assessment     Good tolerance to therex today. Continues with min c/o difficulty with prone exercises, but able to perform. Good soft tissue response to dry needling evident by increased grasp with unilateral winding at all insertion points, particularly L UT today. Winding performed every 5 minutes during treatment. No adverse effects following treatment. Overall Alexander is making excellent progress with therapy. He continues with mild forward head/rounded shoulder posture, but is demonstrating improved postural awareness. Trigger point tenderness overall improving, continues with mild tenderness to scalenes and upper trap on L side.   Alexander is progressing well towards his goals.   Pt prognosis is Good.     Pt will continue to benefit from skilled outpatient physical therapy to address the deficits listed in the  problem list box on initial evaluation, provide pt/family education and to maximize pt's level of independence in the home and community environment.     Pt's spiritual, cultural and educational needs considered and pt agreeable to plan of care and goals.    Anticipated barriers to physical therapy: none    Goals: IE 11/13/2018, PN due 2/3/2019  Short Term Goals (4 Weeks):   1. Pt will report 20% reduction in pain of the cervical spine for ease with computer work Met 1/3/2019  2. PT will demonstrate 1/3 MMT improvement in periscapular strength for ease with upright posture Met 1/3/2019  3. Pt will demonstrate improved cervical spine ROM in all directions by 5 degrees for ease with driving Met 1/3/2019    Long Term Goals (8 Weeks):   1. Pt will report being independent with HEP for maintenance of improvements gained during therapy sessions Progressing, not met  2. PT will report 50% reduction of pain of the neck and LUE for ease with tasks at work and in community Progressing, not met  3. Pt will demonstrate full BUE and periscapular strength without the provocation of pain for ease with caring for playing with his children. Progressing, not met  4. Pt will demonstrate appropriate upright posture without external cueing for ease with computer work.  Progressing, not met        Plan     Decrease frequency to 1x week for 4 weeks. Continue treatments per original plan of care with focus on improved scapular strength and posture. Dry needling as needed to decrease pain and improve soft tissue mobility.          More Asencio, PT

## 2019-01-10 NOTE — PROGRESS NOTES
"  Physical Therapy Daily Treatment Note     Name: Alexander Hassanbill  Clinic Number: [id number]    Therapy Diagnosis:   Encounter Diagnoses   Name Primary?    Poor posture     Stiffness of cervical spine     Neck pain on left side      Physician: Zoie Driver, *    Visit Date: 1/11/2019    Physician Orders: PT Eval and Treat Chronic neck pain with normal spine x-rays.    Medical Diagnosis from Referral: Chronic neck pain [M54.2, G89.29]   Evaluation Date: 11/13/2018  Authorization Period Expiration: 12/31/2018  Plan of Care Expiration: 12/28/2018  Visit # / Visits authorized: 8/ 20       Time In: 8:00 AM  Time Out: 9:00 AM  Total Billable Time: 60 minutes    Precautions: Standard    Subjective     Pt reports: feeling a little more stiff after a hard week at work.   He was compliant with home exercise program.   Response to previous treatment: decreased pain after dry needling.  Functional change: none    Pain: 2/10 "1 1/2 or 2"  Location: left neck      Objective     Alexander received therapeutic exercises to develop strength, endurance, ROM, flexibility, posture and core stabilization for 45 minutes including:    UBE 3' fwd/3' bwd (collected history, assessed pt complaints, education on causes of cervicogenic HA; importance of exercise)     Stretch Repetitions Duration    UT 3 30" NP   LS 3 30" NP   Pec 1/2 foam roll 1 2 min NP             Isotonics Resistance Sets x Repetitions    Prone Y 1# 1 x 15    Prone T 1# 1 x 15    Prone A 1# 1 x 15    1/2 foam supine punch 4# 2 x 15    1/2 foam horiz abd/add 4# 2 x 15    1/2 foam flex/ext 4# 2 x 15                          Isometrics Hold Sets x Repeititions    Seated chin tucks Deferred      Supine DNF hold 10" 10x NP                     Alexander received the following manual therapy techniques: Joint mobilizations, Manual traction, Myofacial release, Soft tissue Mobilization and Dry Needling were applied to the: neck for 15 minutes, including:    Application of " TDN: Pt educated on benefits and potential side effects of dry needling. Educated pt on benefits, precautions, side effects followign TDN. Educated pt to use heat following treatment sessions if pt is experiencing pain or soreness. Pt verbalized good understanding of education.  Pt signed written consent to dry needling. Pt gave verbal consent for DN    Pt received dry needling to the below listed muscles using 30 mm needles. Winding performed every 5 minutes during treatment.  L: UT,  lyn        Home Exercises Provided and Patient Education Provided     Education provided:   - Pt encouraged to continue to use timer to check in on posture throughout his work day.     Written Home Exercises Provided: yes.  Exercises were reviewed and Alexander was able to demonstrate them prior to the end of the session.  Alexander demonstrated good  understanding of the education provided.     See EMR under Patient Instructions for exercises provided 12/6/2018.    Assessment     Good tolerance to all therex today. Increased weight with 1/2 foam exercises with min c/o mm fatigue, but able to complete. Good soft tissue response to dry needling evident by increased grasp with unilateral winding at all insertion points. Winding performed every 5 minutes during treatment. No adverse effects following treatment.   Alexander is progressing well towards his goals.   Pt prognosis is Good.     Pt will continue to benefit from skilled outpatient physical therapy to address the deficits listed in the problem list box on initial evaluation, provide pt/family education and to maximize pt's level of independence in the home and community environment.     Pt's spiritual, cultural and educational needs considered and pt agreeable to plan of care and goals.    Anticipated barriers to physical therapy: none    Goals: IE 11/13/2018, PN due 2/3/2019  Short Term Goals (4 Weeks):   1. Pt will report 20% reduction in pain of the cervical spine for ease with  computer work Met 1/3/2019  2. PT will demonstrate 1/3 MMT improvement in periscapular strength for ease with upright posture Met 1/3/2019  3. Pt will demonstrate improved cervical spine ROM in all directions by 5 degrees for ease with driving Met 1/3/2019    Long Term Goals (8 Weeks):   1. Pt will report being independent with HEP for maintenance of improvements gained during therapy sessions Progressing, not met  2. PT will report 50% reduction of pain of the neck and LUE for ease with tasks at work and in community Progressing, not met  3. Pt will demonstrate full BUE and periscapular strength without the provocation of pain for ease with caring for playing with his children. Progressing, not met  4. Pt will demonstrate appropriate upright posture without external cueing for ease with computer work.  Progressing, not met        Plan     Continue plan with focus on scapular strengthening, dry needling as needed. Progress towards discharge to independent HEP as tolerated.           More Asencio, PT

## 2019-01-11 ENCOUNTER — CLINICAL SUPPORT (OUTPATIENT)
Dept: REHABILITATION | Facility: OTHER | Age: 39
End: 2019-01-11
Payer: COMMERCIAL

## 2019-01-11 DIAGNOSIS — M43.6 STIFFNESS OF CERVICAL SPINE: ICD-10-CM

## 2019-01-11 DIAGNOSIS — R29.3 POOR POSTURE: ICD-10-CM

## 2019-01-11 DIAGNOSIS — M54.2 NECK PAIN ON LEFT SIDE: ICD-10-CM

## 2019-01-11 PROCEDURE — 97140 MANUAL THERAPY 1/> REGIONS: CPT | Mod: PN | Performed by: PHYSICAL THERAPIST

## 2019-01-11 PROCEDURE — 97110 THERAPEUTIC EXERCISES: CPT | Mod: PN | Performed by: PHYSICAL THERAPIST

## 2019-01-17 ENCOUNTER — CLINICAL SUPPORT (OUTPATIENT)
Dept: REHABILITATION | Facility: OTHER | Age: 39
End: 2019-01-17
Payer: COMMERCIAL

## 2019-01-17 DIAGNOSIS — M54.2 NECK PAIN ON LEFT SIDE: ICD-10-CM

## 2019-01-17 DIAGNOSIS — R29.3 POOR POSTURE: ICD-10-CM

## 2019-01-17 DIAGNOSIS — M43.6 STIFFNESS OF CERVICAL SPINE: ICD-10-CM

## 2019-01-17 PROCEDURE — 97110 THERAPEUTIC EXERCISES: CPT | Mod: PN | Performed by: PHYSICAL THERAPIST

## 2019-01-17 PROCEDURE — 97140 MANUAL THERAPY 1/> REGIONS: CPT | Mod: PN | Performed by: PHYSICAL THERAPIST

## 2019-01-17 NOTE — PROGRESS NOTES
"  Physical Therapy Daily Treatment Note     Name: Alexander Hassanbill  Clinic Number: [id number]    Therapy Diagnosis:   Encounter Diagnoses   Name Primary?    Poor posture     Stiffness of cervical spine     Neck pain on left side      Physician: Zoie Driver, *    Visit Date: 1/17/2019    Physician Orders: PT Eval and Treat Chronic neck pain with normal spine x-rays.    Medical Diagnosis from Referral: Chronic neck pain [M54.2, G89.29]   Evaluation Date: 11/13/2018  Authorization Period Expiration: 12/31/2018  Plan of Care Expiration: 12/28/2018  Visit # / Visits authorized: 9/ 20       Time In: 8:00 AM  Time Out: 8:45 AM  Total Billable Time: 45 minutes    Precautions: Standard    Subjective     Pt reports: feeling good today. He says he has been much more aware of his posture and correcting himself through his work day. He has been doing exercises at home and feels confident with continuing on his own at this time.   He was compliant with home exercise program.   Response to previous treatment: decreased pain after dry needling.  Functional change: none    Pain: 2/10 "1 1/2 or 2"   Location: left neck      Objective     Alexander received therapeutic exercises to develop strength, endurance, ROM, flexibility, posture and core stabilization for 45 minutes including:    UBE 3' fwd/3' bwd (collected history, assessed pt complaints, education on causes of cervicogenic HA; importance of exercise)     Stretch Repetitions Duration    UT 3 30" NP   LS 3 30" NP   Pec 1/2 foam roll 1 2 min NP             Isotonics Resistance Sets x Repetitions    Prone Y 1# 1 x 15    Prone T 1# 1 x 15    Prone A 1# 1 x 15    1/2 foam supine punch 4# 2 x 15    1/2 foam horiz abd/add 4# 2 x 15    1/2 foam flex/ext 4# 2 x 15                          Isometrics Hold Sets x Repeititions    Seated chin tucks Deferred      Supine DNF hold 10" 10x NP                     Alexander received the following manual therapy techniques: Joint " mobilizations, Manual traction, Myofacial release, Soft tissue Mobilization and Dry Needling were applied to the: neck for 15 minutes, including:    Application of TDN: Pt educated on benefits and potential side effects of dry needling. Educated pt on benefits, precautions, side effects followign TDN. Educated pt to use heat following treatment sessions if pt is experiencing pain or soreness. Pt verbalized good understanding of education.  Pt signed written consent to dry needling. Pt gave verbal consent for DN    Pt received dry needling to the below listed muscles using 30 mm needles. Winding performed every 5 minutes during treatment.  L: UT,  lyn        CMS Impairment/Limitation/Restriction for FOTO Neck Survey    Therapist reviewed FOTO scores for Alexander Colmenares on 1/17/2019.   FOTO documents entered into [employer] - see Media section.    Limitation Score: 28%  Category: Body Position    Current : CJ = at least 20% but < 40% impaired, limited or restricted  Goal: CJ = at least 20% but < 40% impaired, limited or restricted  Discharge: CJ = at least 20% but < 40% impaired, limited or restricted           Home Exercises Provided and Patient Education Provided     Education provided:   - Pt encouraged to continue to use timer to check in on posture throughout his work day.     Written Home Exercises Provided: yes.  Exercises were reviewed and Alexander was able to demonstrate them prior to the end of the session.  Alexander demonstrated good  understanding of the education provided.     See EMR under Patient Instructions for exercises provided 12/6/2018.    Assessment     Pt is independent with HEP and demonstrates good return technique. Pt no longer has pain or difficulty with functional activities and has returned to PLOF. Pt has progressed well and has met all of his therapeutic goals. Pt no longer requires skilled PT. Recommend D/C from skilled care.    Pt's spiritual, cultural and educational needs considered  and pt agreeable to plan of care and goals.    Anticipated barriers to physical therapy: none    Goals:  Short Term Goals (4 Weeks):   1. Pt will report 20% reduction in pain of the cervical spine for ease with computer work Met 1/3/2019  2. PT will demonstrate 1/3 MMT improvement in periscapular strength for ease with upright posture Met 1/3/2019  3. Pt will demonstrate improved cervical spine ROM in all directions by 5 degrees for ease with driving Met 1/3/2019    Long Term Goals (8 Weeks):   1. Pt will report being independent with HEP for maintenance of improvements gained during therapy sessions Met 1/17/2019  2. PT will report 50% reduction of pain of the neck and LUE for ease with tasks at work and in community Met 1/17/2019  3. Pt will demonstrate full BUE and periscapular strength without the provocation of pain for ease with caring for playing with his children. Met 1/17/2019  4. Pt will demonstrate appropriate upright posture without external cueing for ease with computer work. Met 1/17/2019        Plan     Discharge to independent HEP.      More Asencio, PT

## 2020-10-08 ENCOUNTER — PATIENT MESSAGE (OUTPATIENT)
Dept: PRIMARY CARE CLINIC | Facility: CLINIC | Age: 40
End: 2020-10-08

## 2021-01-07 ENCOUNTER — OFFICE VISIT (OUTPATIENT)
Dept: URGENT CARE | Facility: CLINIC | Age: 41
End: 2021-01-07
Payer: COMMERCIAL

## 2021-01-07 VITALS
TEMPERATURE: 96 F | OXYGEN SATURATION: 98 % | HEIGHT: 71 IN | DIASTOLIC BLOOD PRESSURE: 91 MMHG | HEART RATE: 80 BPM | SYSTOLIC BLOOD PRESSURE: 136 MMHG | WEIGHT: 190 LBS | BODY MASS INDEX: 26.6 KG/M2 | RESPIRATION RATE: 18 BRPM

## 2021-01-07 DIAGNOSIS — U07.1 COVID-19 VIRUS DETECTED: ICD-10-CM

## 2021-01-07 DIAGNOSIS — U07.1 COVID-19: Primary | ICD-10-CM

## 2021-01-07 LAB
CTP QC/QA: YES
SARS-COV-2 RDRP RESP QL NAA+PROBE: POSITIVE

## 2021-01-07 PROCEDURE — U0002: ICD-10-PCS | Mod: QW,S$GLB,, | Performed by: NURSE PRACTITIONER

## 2021-01-07 PROCEDURE — 99211 OFF/OP EST MAY X REQ PHY/QHP: CPT | Mod: S$GLB,,, | Performed by: NURSE PRACTITIONER

## 2021-01-07 PROCEDURE — 3008F BODY MASS INDEX DOCD: CPT | Mod: CPTII,S$GLB,, | Performed by: NURSE PRACTITIONER

## 2021-01-07 PROCEDURE — 99211 PR OFFICE/OUTPT VISIT, EST, LEVL I: ICD-10-PCS | Mod: S$GLB,,, | Performed by: NURSE PRACTITIONER

## 2021-01-07 PROCEDURE — 3008F PR BODY MASS INDEX (BMI) DOCUMENTED: ICD-10-PCS | Mod: CPTII,S$GLB,, | Performed by: NURSE PRACTITIONER

## 2021-01-07 PROCEDURE — U0002 COVID-19 LAB TEST NON-CDC: HCPCS | Mod: QW,S$GLB,, | Performed by: NURSE PRACTITIONER

## 2021-04-05 ENCOUNTER — PATIENT MESSAGE (OUTPATIENT)
Dept: ADMINISTRATIVE | Facility: HOSPITAL | Age: 41
End: 2021-04-05

## 2021-04-28 ENCOUNTER — PATIENT MESSAGE (OUTPATIENT)
Dept: RESEARCH | Facility: HOSPITAL | Age: 41
End: 2021-04-28

## 2021-05-28 ENCOUNTER — OFFICE VISIT (OUTPATIENT)
Dept: URGENT CARE | Facility: CLINIC | Age: 41
End: 2021-05-28
Payer: COMMERCIAL

## 2021-05-28 VITALS
HEART RATE: 76 BPM | TEMPERATURE: 98 F | DIASTOLIC BLOOD PRESSURE: 90 MMHG | OXYGEN SATURATION: 100 % | RESPIRATION RATE: 18 BRPM | SYSTOLIC BLOOD PRESSURE: 141 MMHG

## 2021-05-28 DIAGNOSIS — H61.23 BILATERAL IMPACTED CERUMEN: Primary | ICD-10-CM

## 2021-05-28 PROCEDURE — 99213 PR OFFICE/OUTPT VISIT, EST, LEVL III, 20-29 MIN: ICD-10-PCS | Mod: 25,S$GLB,, | Performed by: FAMILY MEDICINE

## 2021-05-28 PROCEDURE — 69209 REMOVE IMPACTED EAR WAX UNI: CPT | Mod: 50,S$GLB,, | Performed by: FAMILY MEDICINE

## 2021-05-28 PROCEDURE — 99213 OFFICE O/P EST LOW 20 MIN: CPT | Mod: 25,S$GLB,, | Performed by: FAMILY MEDICINE

## 2021-05-28 PROCEDURE — 69209 EAR CERUMEN REMOVAL: ICD-10-PCS | Mod: 50,S$GLB,, | Performed by: FAMILY MEDICINE

## 2021-05-31 ENCOUNTER — OFFICE VISIT (OUTPATIENT)
Dept: INTERNAL MEDICINE | Facility: CLINIC | Age: 41
End: 2021-05-31
Attending: INTERNAL MEDICINE
Payer: COMMERCIAL

## 2021-05-31 ENCOUNTER — LAB VISIT (OUTPATIENT)
Dept: LAB | Facility: OTHER | Age: 41
End: 2021-05-31
Attending: INTERNAL MEDICINE
Payer: COMMERCIAL

## 2021-05-31 VITALS
BODY MASS INDEX: 26.08 KG/M2 | DIASTOLIC BLOOD PRESSURE: 88 MMHG | SYSTOLIC BLOOD PRESSURE: 120 MMHG | HEART RATE: 72 BPM | HEIGHT: 71 IN | WEIGHT: 186.31 LBS | OXYGEN SATURATION: 98 %

## 2021-05-31 DIAGNOSIS — Z00.00 ANNUAL PHYSICAL EXAM: Primary | ICD-10-CM

## 2021-05-31 DIAGNOSIS — Z11.4 SCREENING FOR HIV WITHOUT PRESENCE OF RISK FACTORS: ICD-10-CM

## 2021-05-31 DIAGNOSIS — Z80.52 FAMILY HISTORY OF BLADDER CANCER: ICD-10-CM

## 2021-05-31 DIAGNOSIS — R03.0 ELEVATED BLOOD PRESSURE READING WITHOUT DIAGNOSIS OF HYPERTENSION: ICD-10-CM

## 2021-05-31 DIAGNOSIS — Z00.00 ANNUAL PHYSICAL EXAM: ICD-10-CM

## 2021-05-31 DIAGNOSIS — Z11.59 ENCOUNTER FOR HEPATITIS C SCREENING TEST FOR LOW RISK PATIENT: ICD-10-CM

## 2021-05-31 DIAGNOSIS — Z82.79 FAMILY HISTORY OF BICUSPID AORTIC VALVE: ICD-10-CM

## 2021-05-31 LAB
ALBUMIN SERPL BCP-MCNC: 4.6 G/DL (ref 3.5–5.2)
ALP SERPL-CCNC: 29 U/L (ref 55–135)
ALT SERPL W/O P-5'-P-CCNC: 16 U/L (ref 10–44)
ANION GAP SERPL CALC-SCNC: 9 MMOL/L (ref 8–16)
AST SERPL-CCNC: 17 U/L (ref 10–40)
BASOPHILS # BLD AUTO: 0.04 K/UL (ref 0–0.2)
BASOPHILS NFR BLD: 0.7 % (ref 0–1.9)
BILIRUB SERPL-MCNC: 1.1 MG/DL (ref 0.1–1)
BUN SERPL-MCNC: 13 MG/DL (ref 6–20)
CALCIUM SERPL-MCNC: 9.8 MG/DL (ref 8.7–10.5)
CHLORIDE SERPL-SCNC: 103 MMOL/L (ref 95–110)
CHOLEST SERPL-MCNC: 232 MG/DL (ref 120–199)
CHOLEST/HDLC SERPL: 4.7 {RATIO} (ref 2–5)
CO2 SERPL-SCNC: 27 MMOL/L (ref 23–29)
CREAT SERPL-MCNC: 1 MG/DL (ref 0.5–1.4)
DIFFERENTIAL METHOD: NORMAL
EOSINOPHIL # BLD AUTO: 0.3 K/UL (ref 0–0.5)
EOSINOPHIL NFR BLD: 5.1 % (ref 0–8)
ERYTHROCYTE [DISTWIDTH] IN BLOOD BY AUTOMATED COUNT: 12.4 % (ref 11.5–14.5)
EST. GFR  (AFRICAN AMERICAN): >60 ML/MIN/1.73 M^2
EST. GFR  (NON AFRICAN AMERICAN): >60 ML/MIN/1.73 M^2
ESTIMATED AVG GLUCOSE: 100 MG/DL (ref 68–131)
GLUCOSE SERPL-MCNC: 76 MG/DL (ref 70–110)
HBA1C MFR BLD: 5.1 % (ref 4–5.6)
HCT VFR BLD AUTO: 50.7 % (ref 40–54)
HDLC SERPL-MCNC: 49 MG/DL (ref 40–75)
HDLC SERPL: 21.1 % (ref 20–50)
HGB BLD-MCNC: 16.2 G/DL (ref 14–18)
IMM GRANULOCYTES # BLD AUTO: 0.01 K/UL (ref 0–0.04)
IMM GRANULOCYTES NFR BLD AUTO: 0.2 % (ref 0–0.5)
LDLC SERPL CALC-MCNC: 141.8 MG/DL (ref 63–159)
LYMPHOCYTES # BLD AUTO: 1.6 K/UL (ref 1–4.8)
LYMPHOCYTES NFR BLD: 28.5 % (ref 18–48)
MCH RBC QN AUTO: 27.3 PG (ref 27–31)
MCHC RBC AUTO-ENTMCNC: 32 G/DL (ref 32–36)
MCV RBC AUTO: 86 FL (ref 82–98)
MONOCYTES # BLD AUTO: 0.5 K/UL (ref 0.3–1)
MONOCYTES NFR BLD: 7.9 % (ref 4–15)
NEUTROPHILS # BLD AUTO: 3.3 K/UL (ref 1.8–7.7)
NEUTROPHILS NFR BLD: 57.6 % (ref 38–73)
NONHDLC SERPL-MCNC: 183 MG/DL
NRBC BLD-RTO: 0 /100 WBC
PLATELET # BLD AUTO: 269 K/UL (ref 150–450)
PMV BLD AUTO: 10.3 FL (ref 9.2–12.9)
POTASSIUM SERPL-SCNC: 4.1 MMOL/L (ref 3.5–5.1)
PROT SERPL-MCNC: 7.6 G/DL (ref 6–8.4)
RBC # BLD AUTO: 5.93 M/UL (ref 4.6–6.2)
SODIUM SERPL-SCNC: 139 MMOL/L (ref 136–145)
TRIGL SERPL-MCNC: 206 MG/DL (ref 30–150)
TSH SERPL DL<=0.005 MIU/L-ACNC: 1.62 UIU/ML (ref 0.4–4)
WBC # BLD AUTO: 5.69 K/UL (ref 3.9–12.7)

## 2021-05-31 PROCEDURE — 80053 COMPREHEN METABOLIC PANEL: CPT | Performed by: INTERNAL MEDICINE

## 2021-05-31 PROCEDURE — 36415 COLL VENOUS BLD VENIPUNCTURE: CPT | Performed by: INTERNAL MEDICINE

## 2021-05-31 PROCEDURE — 1126F AMNT PAIN NOTED NONE PRSNT: CPT | Mod: S$GLB,,, | Performed by: INTERNAL MEDICINE

## 2021-05-31 PROCEDURE — 99396 PR PREVENTIVE VISIT,EST,40-64: ICD-10-PCS | Mod: S$GLB,,, | Performed by: INTERNAL MEDICINE

## 2021-05-31 PROCEDURE — 1126F PR PAIN SEVERITY QUANTIFIED, NO PAIN PRESENT: ICD-10-PCS | Mod: S$GLB,,, | Performed by: INTERNAL MEDICINE

## 2021-05-31 PROCEDURE — 99999 PR PBB SHADOW E&M-EST. PATIENT-LVL III: ICD-10-PCS | Mod: PBBFAC,,, | Performed by: INTERNAL MEDICINE

## 2021-05-31 PROCEDURE — 84443 ASSAY THYROID STIM HORMONE: CPT | Performed by: INTERNAL MEDICINE

## 2021-05-31 PROCEDURE — 80061 LIPID PANEL: CPT | Performed by: INTERNAL MEDICINE

## 2021-05-31 PROCEDURE — 83036 HEMOGLOBIN GLYCOSYLATED A1C: CPT | Performed by: INTERNAL MEDICINE

## 2021-05-31 PROCEDURE — 3008F PR BODY MASS INDEX (BMI) DOCUMENTED: ICD-10-PCS | Mod: CPTII,S$GLB,, | Performed by: INTERNAL MEDICINE

## 2021-05-31 PROCEDURE — 99999 PR PBB SHADOW E&M-EST. PATIENT-LVL III: CPT | Mod: PBBFAC,,, | Performed by: INTERNAL MEDICINE

## 2021-05-31 PROCEDURE — 86703 HIV-1/HIV-2 1 RESULT ANTBDY: CPT | Performed by: INTERNAL MEDICINE

## 2021-05-31 PROCEDURE — 85025 COMPLETE CBC W/AUTO DIFF WBC: CPT | Performed by: INTERNAL MEDICINE

## 2021-05-31 PROCEDURE — 3008F BODY MASS INDEX DOCD: CPT | Mod: CPTII,S$GLB,, | Performed by: INTERNAL MEDICINE

## 2021-05-31 PROCEDURE — 99396 PREV VISIT EST AGE 40-64: CPT | Mod: S$GLB,,, | Performed by: INTERNAL MEDICINE

## 2021-05-31 PROCEDURE — 86803 HEPATITIS C AB TEST: CPT | Performed by: INTERNAL MEDICINE

## 2021-06-01 LAB
HCV AB SERPL QL IA: NEGATIVE
HIV 1+2 AB+HIV1 P24 AG SERPL QL IA: NEGATIVE

## 2021-06-08 ENCOUNTER — HOSPITAL ENCOUNTER (OUTPATIENT)
Dept: CARDIOLOGY | Facility: OTHER | Age: 41
Discharge: HOME | End: 2021-06-08
Attending: INTERNAL MEDICINE
Payer: COMMERCIAL

## 2021-06-08 ENCOUNTER — PATIENT MESSAGE (OUTPATIENT)
Dept: INTERNAL MEDICINE | Facility: CLINIC | Age: 41
End: 2021-06-08

## 2021-06-08 VITALS
DIASTOLIC BLOOD PRESSURE: 88 MMHG | BODY MASS INDEX: 26.04 KG/M2 | SYSTOLIC BLOOD PRESSURE: 120 MMHG | HEIGHT: 71 IN | WEIGHT: 186 LBS

## 2021-06-08 LAB
ASCENDING AORTA: 3.03 CM
AV INDEX (PROSTH): 0.7
AV MEAN GRADIENT: 2 MMHG
AV PEAK GRADIENT: 3 MMHG
AV VALVE AREA: 2.75 CM2
AV VELOCITY RATIO: 0.89
BSA FOR ECHO PROCEDURE: 2.06 M2
CV ECHO LV RWT: 0.3 CM
DOP CALC AO PEAK VEL: 0.92 M/S
DOP CALC AO VTI: 21.57 CM
DOP CALC LVOT AREA: 3.9 CM2
DOP CALC LVOT DIAMETER: 2.24 CM
DOP CALC LVOT PEAK VEL: 0.82 M/S
DOP CALC LVOT STROKE VOLUME: 59.36 CM3
DOP CALCLVOT PEAK VEL VTI: 15.07 CM
E WAVE DECELERATION TIME: 229.62 MSEC
E/A RATIO: 1.22
E/E' RATIO: 5.26 M/S
ECHO LV POSTERIOR WALL: 0.71 CM (ref 0.6–1.1)
EJECTION FRACTION: 60 %
FRACTIONAL SHORTENING: 31 % (ref 28–44)
INTERVENTRICULAR SEPTUM: 0.74 CM (ref 0.6–1.1)
IVRT: 125.69 MSEC
LA MAJOR: 3.61 CM
LA MINOR: 3.7 CM
LA WIDTH: 3.48 CM
LEFT ATRIUM SIZE: 2.73 CM
LEFT ATRIUM VOLUME INDEX MOD: 13.7 ML/M2
LEFT ATRIUM VOLUME INDEX: 14.5 ML/M2
LEFT ATRIUM VOLUME MOD: 28 CM3
LEFT ATRIUM VOLUME: 29.51 CM3
LEFT INTERNAL DIMENSION IN SYSTOLE: 3.25 CM (ref 2.1–4)
LEFT VENTRICLE DIASTOLIC VOLUME INDEX: 50.74 ML/M2
LEFT VENTRICLE DIASTOLIC VOLUME: 103.51 ML
LEFT VENTRICLE MASS INDEX: 53 G/M2
LEFT VENTRICLE SYSTOLIC VOLUME INDEX: 20.8 ML/M2
LEFT VENTRICLE SYSTOLIC VOLUME: 42.44 ML
LEFT VENTRICULAR INTERNAL DIMENSION IN DIASTOLE: 4.72 CM (ref 3.5–6)
LEFT VENTRICULAR MASS: 108.54 G
LV LATERAL E/E' RATIO: 5 M/S
LV SEPTAL E/E' RATIO: 5.56 M/S
MV A" WAVE DURATION": 10.35 MSEC
MV PEAK A VEL: 0.41 M/S
MV PEAK E VEL: 0.5 M/S
MV STENOSIS PRESSURE HALF TIME: 66.59 MS
MV VALVE AREA P 1/2 METHOD: 3.3 CM2
PISA TR MAX VEL: 1.76 M/S
PULM VEIN S/D RATIO: 1.24
PV PEAK D VEL: 0.34 M/S
PV PEAK S VEL: 0.42 M/S
PV PEAK VELOCITY: 0.6 CM/S
RA MAJOR: 4.28 CM
RA WIDTH: 3.44 CM
RIGHT VENTRICULAR END-DIASTOLIC DIMENSION: 3.03 CM
SINUS: 3.5 CM
STJ: 2.96 CM
TDI LATERAL: 0.1 M/S
TDI SEPTAL: 0.09 M/S
TDI: 0.1 M/S
TR MAX PG: 12 MMHG
TRICUSPID ANNULAR PLANE SYSTOLIC EXCURSION: 2.21 CM

## 2021-06-08 PROCEDURE — 93306 ECHO (CUPID ONLY): ICD-10-PCS | Mod: 26,,, | Performed by: INTERNAL MEDICINE

## 2021-06-08 PROCEDURE — 93306 TTE W/DOPPLER COMPLETE: CPT | Mod: 26,,, | Performed by: INTERNAL MEDICINE

## 2021-06-08 PROCEDURE — C8929 TTE W OR WO FOL WCON,DOPPLER: HCPCS

## 2021-06-22 ENCOUNTER — PATIENT MESSAGE (OUTPATIENT)
Dept: INTERNAL MEDICINE | Facility: CLINIC | Age: 41
End: 2021-06-22

## 2021-11-04 ENCOUNTER — IMMUNIZATION (OUTPATIENT)
Dept: INTERNAL MEDICINE | Facility: CLINIC | Age: 41
End: 2021-11-04
Payer: COMMERCIAL

## 2021-11-04 DIAGNOSIS — Z23 NEED FOR VACCINATION: Primary | ICD-10-CM

## 2021-11-04 PROCEDURE — 0064A COVID-19, MRNA, LNP-S, PF, 100 MCG/0.25 ML DOSE VACCINE (MODERNA BOOSTER): CPT | Mod: PBBFAC | Performed by: INTERNAL MEDICINE

## 2022-03-16 ENCOUNTER — OFFICE VISIT (OUTPATIENT)
Dept: OTOLARYNGOLOGY | Facility: CLINIC | Age: 42
End: 2022-03-16
Payer: COMMERCIAL

## 2022-03-16 VITALS — TEMPERATURE: 98 F | HEART RATE: 96 BPM | DIASTOLIC BLOOD PRESSURE: 84 MMHG | SYSTOLIC BLOOD PRESSURE: 126 MMHG

## 2022-03-16 DIAGNOSIS — G47.9 SLEEP DISTURBANCE: ICD-10-CM

## 2022-03-16 DIAGNOSIS — R06.83 SNORING: Primary | ICD-10-CM

## 2022-03-16 PROCEDURE — 1159F PR MEDICATION LIST DOCUMENTED IN MEDICAL RECORD: ICD-10-PCS | Mod: CPTII,S$GLB,, | Performed by: OTOLARYNGOLOGY

## 2022-03-16 PROCEDURE — 99204 OFFICE O/P NEW MOD 45 MIN: CPT | Mod: S$GLB,,, | Performed by: OTOLARYNGOLOGY

## 2022-03-16 PROCEDURE — 1160F PR REVIEW ALL MEDS BY PRESCRIBER/CLIN PHARMACIST DOCUMENTED: ICD-10-PCS | Mod: CPTII,S$GLB,, | Performed by: OTOLARYNGOLOGY

## 2022-03-16 PROCEDURE — 1160F RVW MEDS BY RX/DR IN RCRD: CPT | Mod: CPTII,S$GLB,, | Performed by: OTOLARYNGOLOGY

## 2022-03-16 PROCEDURE — 1159F MED LIST DOCD IN RCRD: CPT | Mod: CPTII,S$GLB,, | Performed by: OTOLARYNGOLOGY

## 2022-03-16 PROCEDURE — 99204 PR OFFICE/OUTPT VISIT, NEW, LEVL IV, 45-59 MIN: ICD-10-PCS | Mod: S$GLB,,, | Performed by: OTOLARYNGOLOGY

## 2022-03-16 NOTE — PROGRESS NOTES
Mr. Colmenares     There were no vitals filed for this visit.    Chief Complaint:  Snoring and possible sleep apnea     HPI:   is a 41-year-old white male presents with a history of increasing snoring at night.  He states the occasionally wakes himself up snoring.  No witnessed apneic episodes however by his wife.  He denies any nasal obstruction other than occasional congestion with occasional seasonal allergies.  He does occasionally have daytime somnolence.  No history of chronic recurrent sinus infections since childhood.  His father has recently been diagnosed with sleep apnea and he was wondering if he was headed in that direction as well.    SNOT22- 17 NOSE- 15    Review of Systems:  Constitutional:   weight loss or weight gain: Negative  Allergy/Immunologic:   Very rare allergy symptomatology  Nasal Congestion/Obstruction:   Negative  Nosebleeds:   Negative  Sinus infections:   Negative  Headache/Facial Pain:   Negative  Snoring/YAO:   Positive for snoring and sleep disturbance.  Throat: Infections/Pain:   Negative  Hoarseness/Speech Disturbance:   Negative  Trauma Hx:  Negative    Cardiovascular:  M/I Angina: Negative  Hypertension: Negative  Endocrine:    DM/Steroids: Negative  GI:   Dysphagia/Reflux: Negative  :   GYN Pregnancy: Negative  Renal:   Dialysis: Negative  Lymphatic:   Neck Mass/Lymphadenopathy: Negative  Muscoloskeletal:   Negative  Hematologic:   Bleeding Disorders/Anemia: Negative  Neurologic:    Cranial/Neuralgia: Negative  Pulmonary:   Asthma/SOB/Cough: Negative  Skin Disorders: Negative    Past Medical/Surgical/Family/Social History:    ENT Surgery: Negative  Occupational Exposure: Negative   Problems: Negative  Cancer: Negative    Past Family History:   Family history of Cancer: Negative    Past Social History:   Tobacco: Nonsmoker   Alcohol:  1 drink per day Social Drinker      Allergies and medications: Reviewed per med card.    Physical Examination:  Ears:   External  auditory canals:  Bilateral cerumen impaction.   Hearing: Grossly intact   Tympanic Membranes:  Unable to visualize  Nose:   External: Normal with good valvular support.   Intranasal:  Septum relatively straight, turbinates 1 to 2+, nasal airway clear at this time.  Mouth:   Intraorally: Lips, teeth, and gums: Normal   Oropharynx: Normal   Mucosa: Normal   Tongue: Normal  Throat:      Palate: Normal palate with elevation   Tonsils:  Minimal   Posterior Pharynx: Normal  Fiberoptic exam: Not performed  Head/Face:     Inspection: Normal and atraumatic   Palpation/Percussion: Non tender   Facial strength: Normal and symmetric   Salivary glands: Normal  Neck: Supple  Thyroid: No masses  Lymphatics: No nodes  Respiratory:   Effort: Normal  Eyes:   Ocular Mobility: Normal   Vision: Grossly intact  Neuro/Psych:   Cranial Nerves: Grossly Intact   Orientation: Normal   Mood/Affect: Normal      Assessment/Plan:  I have discussed my findings with him in detail as well as my recommendations for treatment.  We discussed saline sinus rinses with distilled water only as well as trial of Flonase nasal sprays.  Will place a consult for Sleep Medicine for evaluation for him.  No anatomic deformities noted at this time requiring surgical intervention.

## 2022-03-23 ENCOUNTER — OFFICE VISIT (OUTPATIENT)
Dept: SLEEP MEDICINE | Facility: CLINIC | Age: 42
End: 2022-03-23
Payer: COMMERCIAL

## 2022-03-23 VITALS
SYSTOLIC BLOOD PRESSURE: 117 MMHG | WEIGHT: 189.81 LBS | HEART RATE: 76 BPM | BODY MASS INDEX: 26.47 KG/M2 | DIASTOLIC BLOOD PRESSURE: 80 MMHG

## 2022-03-23 DIAGNOSIS — R53.83 FATIGUE, UNSPECIFIED TYPE: ICD-10-CM

## 2022-03-23 DIAGNOSIS — R35.1 NOCTURIA: ICD-10-CM

## 2022-03-23 DIAGNOSIS — G47.30 SLEEP APNEA, UNSPECIFIED TYPE: Primary | ICD-10-CM

## 2022-03-23 DIAGNOSIS — G47.9 SLEEP DISTURBANCE: ICD-10-CM

## 2022-03-23 DIAGNOSIS — R06.83 SNORING: ICD-10-CM

## 2022-03-23 DIAGNOSIS — F51.09 OTHER INSOMNIA NOT DUE TO A SUBSTANCE OR KNOWN PHYSIOLOGICAL CONDITION: ICD-10-CM

## 2022-03-23 PROCEDURE — 3008F PR BODY MASS INDEX (BMI) DOCUMENTED: ICD-10-PCS | Mod: CPTII,S$GLB,, | Performed by: INTERNAL MEDICINE

## 2022-03-23 PROCEDURE — 3008F BODY MASS INDEX DOCD: CPT | Mod: CPTII,S$GLB,, | Performed by: INTERNAL MEDICINE

## 2022-03-23 PROCEDURE — 1159F PR MEDICATION LIST DOCUMENTED IN MEDICAL RECORD: ICD-10-PCS | Mod: CPTII,S$GLB,, | Performed by: INTERNAL MEDICINE

## 2022-03-23 PROCEDURE — 99999 PR PBB SHADOW E&M-EST. PATIENT-LVL III: ICD-10-PCS | Mod: PBBFAC,,, | Performed by: INTERNAL MEDICINE

## 2022-03-23 PROCEDURE — 3079F PR MOST RECENT DIASTOLIC BLOOD PRESSURE 80-89 MM HG: ICD-10-PCS | Mod: CPTII,S$GLB,, | Performed by: INTERNAL MEDICINE

## 2022-03-23 PROCEDURE — 3079F DIAST BP 80-89 MM HG: CPT | Mod: CPTII,S$GLB,, | Performed by: INTERNAL MEDICINE

## 2022-03-23 PROCEDURE — 99204 PR OFFICE/OUTPT VISIT, NEW, LEVL IV, 45-59 MIN: ICD-10-PCS | Mod: S$GLB,,, | Performed by: INTERNAL MEDICINE

## 2022-03-23 PROCEDURE — 3074F SYST BP LT 130 MM HG: CPT | Mod: CPTII,S$GLB,, | Performed by: INTERNAL MEDICINE

## 2022-03-23 PROCEDURE — 3074F PR MOST RECENT SYSTOLIC BLOOD PRESSURE < 130 MM HG: ICD-10-PCS | Mod: CPTII,S$GLB,, | Performed by: INTERNAL MEDICINE

## 2022-03-23 PROCEDURE — 99204 OFFICE O/P NEW MOD 45 MIN: CPT | Mod: S$GLB,,, | Performed by: INTERNAL MEDICINE

## 2022-03-23 PROCEDURE — 99999 PR PBB SHADOW E&M-EST. PATIENT-LVL III: CPT | Mod: PBBFAC,,, | Performed by: INTERNAL MEDICINE

## 2022-03-23 PROCEDURE — 1159F MED LIST DOCD IN RCRD: CPT | Mod: CPTII,S$GLB,, | Performed by: INTERNAL MEDICINE

## 2022-03-23 NOTE — PROGRESS NOTES
Referred by Edmond Ospina III, MD     NEW PATIENT VISIT    Alexander Colmenares  is a pleasant 41 y.o. male  with PMH significant for elevated BP readings who presents today for snoring.     SLEEP SCHEDULE   Bed Time 10-11pm   Sleep Latency 30-45min   Arousals 3-5   Nocturia once   Back to sleep    Wake time 6:30AM   Naps none   Work      Vitals:    03/23/22 1348   BP: 117/80   BP Location: Left arm   Patient Position: Sitting   BP Method: Medium (Automatic)   Pulse: 76   Weight: 86.1 kg (189 lb 13.1 oz)     Physical Exam:    GEN:   Well-appearing  Psych:  Appropriate affect, demonstrates insight  SKIN:  No rash on the face or bridge of the nose    LABS:   No results found for: HGB, CO2    RECORDS REVIEWED PREVIOUSLY:    No prior sleep testing.    ASSESSMENT      PROBLEM DESCRIPTION/ Sx on Presentation  STATUS   possible YAO   Snoring, snoring/gasping arousals  Father has YAO  HEENT: MP4, + tongue scalloping  New   Daytime Sx   + sleepiness when inactive   denies sleepiness when driving   ESS 6/24 on intake  New   Nocturia   1-2 times per sleep period  New   Other issues:     PLAN     -will proceed with sleep testing   -discussed trial of PAP therapy if YAO present   -driving precautions were discussed with the patient    RTC          The patient was given open opportunity to ask questions and/or express concerns about treatment plan.   All questions/concerns were discussed.     Two patient identifiers used prior to evaluation.

## 2022-03-25 ENCOUNTER — TELEPHONE (OUTPATIENT)
Dept: SLEEP MEDICINE | Facility: OTHER | Age: 42
End: 2022-03-25
Payer: COMMERCIAL

## 2022-03-28 ENCOUNTER — HOSPITAL ENCOUNTER (OUTPATIENT)
Dept: SLEEP MEDICINE | Facility: OTHER | Age: 42
Discharge: HOME | End: 2022-03-28
Attending: INTERNAL MEDICINE
Payer: COMMERCIAL

## 2022-03-28 DIAGNOSIS — R06.83 SNORING: ICD-10-CM

## 2022-03-28 DIAGNOSIS — G47.30 SLEEP APNEA, UNSPECIFIED TYPE: ICD-10-CM

## 2022-03-28 DIAGNOSIS — R35.1 NOCTURIA: ICD-10-CM

## 2022-03-28 DIAGNOSIS — G47.9 SLEEP DISTURBANCE: ICD-10-CM

## 2022-03-28 DIAGNOSIS — F51.09 OTHER INSOMNIA NOT DUE TO A SUBSTANCE OR KNOWN PHYSIOLOGICAL CONDITION: ICD-10-CM

## 2022-03-28 DIAGNOSIS — R53.83 FATIGUE, UNSPECIFIED TYPE: ICD-10-CM

## 2022-03-28 PROCEDURE — 95806 PR SLEEP STUDY, UNATTENDED, SIMUL RECORD HR/O2 SAT/RESP FLOW/RESP EFFT: ICD-10-PCS | Mod: 26,52,, | Performed by: INTERNAL MEDICINE

## 2022-03-28 PROCEDURE — 95800 SLP STDY UNATTENDED: CPT | Mod: 52

## 2022-03-28 PROCEDURE — 95806 SLEEP STUDY UNATT&RESP EFFT: CPT | Mod: 26,52,, | Performed by: INTERNAL MEDICINE

## 2022-03-29 ENCOUNTER — PATIENT MESSAGE (OUTPATIENT)
Dept: SLEEP MEDICINE | Facility: CLINIC | Age: 42
End: 2022-03-29
Payer: COMMERCIAL

## 2022-03-29 DIAGNOSIS — G47.33 OSA (OBSTRUCTIVE SLEEP APNEA): Primary | ICD-10-CM

## 2022-04-14 ENCOUNTER — PATIENT MESSAGE (OUTPATIENT)
Dept: INTERNAL MEDICINE | Facility: CLINIC | Age: 42
End: 2022-04-14
Payer: COMMERCIAL

## 2022-05-03 ENCOUNTER — PATIENT MESSAGE (OUTPATIENT)
Dept: RESEARCH | Facility: HOSPITAL | Age: 42
End: 2022-05-03
Payer: COMMERCIAL

## 2022-05-16 ENCOUNTER — LAB VISIT (OUTPATIENT)
Dept: LAB | Facility: OTHER | Age: 42
End: 2022-05-16
Attending: INTERNAL MEDICINE
Payer: COMMERCIAL

## 2022-05-16 ENCOUNTER — OFFICE VISIT (OUTPATIENT)
Dept: INTERNAL MEDICINE | Facility: CLINIC | Age: 42
End: 2022-05-16
Attending: INTERNAL MEDICINE
Payer: COMMERCIAL

## 2022-05-16 VITALS
OXYGEN SATURATION: 99 % | DIASTOLIC BLOOD PRESSURE: 96 MMHG | HEIGHT: 71 IN | HEART RATE: 71 BPM | BODY MASS INDEX: 26.88 KG/M2 | SYSTOLIC BLOOD PRESSURE: 134 MMHG | WEIGHT: 192 LBS

## 2022-05-16 DIAGNOSIS — Z00.00 ANNUAL PHYSICAL EXAM: ICD-10-CM

## 2022-05-16 DIAGNOSIS — Z80.52 FAMILY HISTORY OF BLADDER CANCER: ICD-10-CM

## 2022-05-16 DIAGNOSIS — Z00.00 ANNUAL PHYSICAL EXAM: Primary | ICD-10-CM

## 2022-05-16 DIAGNOSIS — Z82.79 FAMILY HISTORY OF BICUSPID AORTIC VALVE: ICD-10-CM

## 2022-05-16 LAB
ALBUMIN SERPL BCP-MCNC: 4.6 G/DL (ref 3.5–5.2)
ALP SERPL-CCNC: 31 U/L (ref 55–135)
ALT SERPL W/O P-5'-P-CCNC: 24 U/L (ref 10–44)
ANION GAP SERPL CALC-SCNC: 12 MMOL/L (ref 8–16)
AST SERPL-CCNC: 17 U/L (ref 10–40)
BASOPHILS # BLD AUTO: 0.05 K/UL (ref 0–0.2)
BASOPHILS NFR BLD: 1 % (ref 0–1.9)
BILIRUB SERPL-MCNC: 0.8 MG/DL (ref 0.1–1)
BUN SERPL-MCNC: 9 MG/DL (ref 6–20)
CALCIUM SERPL-MCNC: 9.8 MG/DL (ref 8.7–10.5)
CHLORIDE SERPL-SCNC: 105 MMOL/L (ref 95–110)
CHOLEST SERPL-MCNC: 273 MG/DL (ref 120–199)
CHOLEST/HDLC SERPL: 5.1 {RATIO} (ref 2–5)
CO2 SERPL-SCNC: 26 MMOL/L (ref 23–29)
CREAT SERPL-MCNC: 1 MG/DL (ref 0.5–1.4)
DIFFERENTIAL METHOD: NORMAL
EOSINOPHIL # BLD AUTO: 0.4 K/UL (ref 0–0.5)
EOSINOPHIL NFR BLD: 7.4 % (ref 0–8)
ERYTHROCYTE [DISTWIDTH] IN BLOOD BY AUTOMATED COUNT: 12.3 % (ref 11.5–14.5)
EST. GFR  (AFRICAN AMERICAN): >60 ML/MIN/1.73 M^2
EST. GFR  (NON AFRICAN AMERICAN): >60 ML/MIN/1.73 M^2
ESTIMATED AVG GLUCOSE: 100 MG/DL (ref 68–131)
GLUCOSE SERPL-MCNC: 84 MG/DL (ref 70–110)
HBA1C MFR BLD: 5.1 % (ref 4–5.6)
HCT VFR BLD AUTO: 49.1 % (ref 40–54)
HDLC SERPL-MCNC: 54 MG/DL (ref 40–75)
HDLC SERPL: 19.8 % (ref 20–50)
HGB BLD-MCNC: 16 G/DL (ref 14–18)
IMM GRANULOCYTES # BLD AUTO: 0 K/UL (ref 0–0.04)
IMM GRANULOCYTES NFR BLD AUTO: 0 % (ref 0–0.5)
LDLC SERPL CALC-MCNC: 171.8 MG/DL (ref 63–159)
LYMPHOCYTES # BLD AUTO: 1.6 K/UL (ref 1–4.8)
LYMPHOCYTES NFR BLD: 31.8 % (ref 18–48)
MCH RBC QN AUTO: 27.9 PG (ref 27–31)
MCHC RBC AUTO-ENTMCNC: 32.6 G/DL (ref 32–36)
MCV RBC AUTO: 86 FL (ref 82–98)
MONOCYTES # BLD AUTO: 0.4 K/UL (ref 0.3–1)
MONOCYTES NFR BLD: 7.8 % (ref 4–15)
NEUTROPHILS # BLD AUTO: 2.7 K/UL (ref 1.8–7.7)
NEUTROPHILS NFR BLD: 52 % (ref 38–73)
NONHDLC SERPL-MCNC: 219 MG/DL
NRBC BLD-RTO: 0 /100 WBC
PLATELET # BLD AUTO: 261 K/UL (ref 150–450)
PMV BLD AUTO: 9.8 FL (ref 9.2–12.9)
POTASSIUM SERPL-SCNC: 4.4 MMOL/L (ref 3.5–5.1)
PROT SERPL-MCNC: 7.4 G/DL (ref 6–8.4)
RBC # BLD AUTO: 5.73 M/UL (ref 4.6–6.2)
SODIUM SERPL-SCNC: 143 MMOL/L (ref 136–145)
TRIGL SERPL-MCNC: 236 MG/DL (ref 30–150)
TSH SERPL DL<=0.005 MIU/L-ACNC: 2.24 UIU/ML (ref 0.4–4)
WBC # BLD AUTO: 5.16 K/UL (ref 3.9–12.7)

## 2022-05-16 PROCEDURE — 99396 PREV VISIT EST AGE 40-64: CPT | Mod: S$GLB,,, | Performed by: INTERNAL MEDICINE

## 2022-05-16 PROCEDURE — 99999 PR PBB SHADOW E&M-EST. PATIENT-LVL III: CPT | Mod: PBBFAC,,, | Performed by: INTERNAL MEDICINE

## 2022-05-16 PROCEDURE — 80053 COMPREHEN METABOLIC PANEL: CPT | Performed by: INTERNAL MEDICINE

## 2022-05-16 PROCEDURE — 3008F BODY MASS INDEX DOCD: CPT | Mod: CPTII,S$GLB,, | Performed by: INTERNAL MEDICINE

## 2022-05-16 PROCEDURE — 1159F MED LIST DOCD IN RCRD: CPT | Mod: CPTII,S$GLB,, | Performed by: INTERNAL MEDICINE

## 2022-05-16 PROCEDURE — 99999 PR PBB SHADOW E&M-EST. PATIENT-LVL III: ICD-10-PCS | Mod: PBBFAC,,, | Performed by: INTERNAL MEDICINE

## 2022-05-16 PROCEDURE — 3080F DIAST BP >= 90 MM HG: CPT | Mod: CPTII,S$GLB,, | Performed by: INTERNAL MEDICINE

## 2022-05-16 PROCEDURE — 3075F SYST BP GE 130 - 139MM HG: CPT | Mod: CPTII,S$GLB,, | Performed by: INTERNAL MEDICINE

## 2022-05-16 PROCEDURE — 80061 LIPID PANEL: CPT | Performed by: INTERNAL MEDICINE

## 2022-05-16 PROCEDURE — 1159F PR MEDICATION LIST DOCUMENTED IN MEDICAL RECORD: ICD-10-PCS | Mod: CPTII,S$GLB,, | Performed by: INTERNAL MEDICINE

## 2022-05-16 PROCEDURE — 3080F PR MOST RECENT DIASTOLIC BLOOD PRESSURE >= 90 MM HG: ICD-10-PCS | Mod: CPTII,S$GLB,, | Performed by: INTERNAL MEDICINE

## 2022-05-16 PROCEDURE — 99396 PR PREVENTIVE VISIT,EST,40-64: ICD-10-PCS | Mod: S$GLB,,, | Performed by: INTERNAL MEDICINE

## 2022-05-16 PROCEDURE — 3008F PR BODY MASS INDEX (BMI) DOCUMENTED: ICD-10-PCS | Mod: CPTII,S$GLB,, | Performed by: INTERNAL MEDICINE

## 2022-05-16 PROCEDURE — 85025 COMPLETE CBC W/AUTO DIFF WBC: CPT | Performed by: INTERNAL MEDICINE

## 2022-05-16 PROCEDURE — 84443 ASSAY THYROID STIM HORMONE: CPT | Performed by: INTERNAL MEDICINE

## 2022-05-16 PROCEDURE — 83036 HEMOGLOBIN GLYCOSYLATED A1C: CPT | Performed by: INTERNAL MEDICINE

## 2022-05-16 PROCEDURE — 36415 COLL VENOUS BLD VENIPUNCTURE: CPT | Performed by: INTERNAL MEDICINE

## 2022-05-16 PROCEDURE — 3075F PR MOST RECENT SYSTOLIC BLOOD PRESS GE 130-139MM HG: ICD-10-PCS | Mod: CPTII,S$GLB,, | Performed by: INTERNAL MEDICINE

## 2022-05-16 NOTE — PROGRESS NOTES
"Subjective:       Patient ID: Alexander Colmenares is a 41 y.o. male.    Chief Complaint: bp and heart health f/u    Here for annual exam    Patient presents today for routine evaluation, physical, and labs. Patient has no major concerns or complaints today.     Pt's BP is well controlled. Pt denies cp/sob/ha/vision or neuro changes. Checking at home and is well controlled. Frequency could be better an pt amenable. Recent readings DBP back into 80s but low 80s. Systolic 110-120s.    Exercise is down. Weight slightly up from his baseline.     LDL range of 130-160.      Family hx:  MGF CVA and MI  Father with hx of bicuspid valve requiring surgery      Review of Systems   Constitutional: Negative for appetite change, chills, fever and unexpected weight change.   HENT: Negative for hearing loss, sore throat and trouble swallowing.    Eyes: Negative for visual disturbance.   Respiratory: Negative for cough, chest tightness and shortness of breath.    Cardiovascular: Negative for chest pain and leg swelling.   Gastrointestinal: Negative for abdominal pain, blood in stool, constipation, diarrhea, nausea and vomiting.   Endocrine: Negative for polydipsia and polyuria.   Genitourinary: Negative for decreased urine volume, difficulty urinating, dysuria, frequency and urgency.   Musculoskeletal: Negative for gait problem.   Skin: Negative for rash.   Neurological: Negative for dizziness and numbness.   Psychiatric/Behavioral: The patient is not nervous/anxious.        Objective:      Vitals:    05/16/22 1134   BP: (!) 134/96   Pulse: 71   SpO2: 99%   Weight: 87.1 kg (192 lb 0.3 oz)   Height: 5' 11" (1.803 m)      Physical Exam  Constitutional:       General: He is not in acute distress.     Appearance: He is well-developed.   HENT:      Head: Normocephalic and atraumatic.      Mouth/Throat:      Pharynx: No oropharyngeal exudate.   Eyes:      General: No scleral icterus.     Conjunctiva/sclera: Conjunctivae normal.      " Pupils: Pupils are equal, round, and reactive to light.   Neck:      Thyroid: No thyromegaly.   Cardiovascular:      Rate and Rhythm: Normal rate and regular rhythm.      Heart sounds: Normal heart sounds. No murmur heard.  Pulmonary:      Effort: Pulmonary effort is normal.      Breath sounds: Normal breath sounds. No wheezing or rales.   Abdominal:      General: There is no distension.      Palpations: Abdomen is soft.      Tenderness: There is no abdominal tenderness.   Musculoskeletal:         General: No tenderness.   Lymphadenopathy:      Cervical: No cervical adenopathy.   Skin:     General: Skin is warm and dry.   Neurological:      Mental Status: He is alert and oriented to person, place, and time.   Psychiatric:         Behavior: Behavior normal.         Assessment:       1. Annual physical exam    2. Family history of bicuspid aortic valve    3. Family history of bladder cancer        Plan:       Alexander was seen today for bp and heart health f/u.    Diagnoses and all orders for this visit:    Annual physical exam  -     Comprehensive Metabolic Panel; Future  -     Lipid Panel; Future  -     TSH; Future  -     CBC Auto Differential; Future  -     Hemoglobin A1C; Future    Family history of bicuspid aortic valve    Family history of bladder cancer           Henrique Mancera MD  Internal Medicine-[facility]        Side effects of medication(s) were discussed in detail and patient voiced understanding.  Patient will call back for any issues or complications.     The 10-year ASCVD risk score ([place] LOY Jr., et al., 2013) is: 1.9%    Values used to calculate the score:      Age: 41 years      Sex: Male      Is Non- : No      Diabetic: No      Tobacco smoker: No      Systolic Blood Pressure: 134 mmHg      Is BP treated: No      HDL Cholesterol: 49 mg/dL      Total Cholesterol: 232 mg/dL

## 2022-06-06 ENCOUNTER — PATIENT MESSAGE (OUTPATIENT)
Dept: SLEEP MEDICINE | Facility: CLINIC | Age: 42
End: 2022-06-06
Payer: COMMERCIAL

## 2022-06-06 DIAGNOSIS — G47.33 OSA (OBSTRUCTIVE SLEEP APNEA): Primary | ICD-10-CM

## 2022-06-30 ENCOUNTER — OFFICE VISIT (OUTPATIENT)
Dept: URGENT CARE | Facility: CLINIC | Age: 42
End: 2022-06-30
Payer: COMMERCIAL

## 2022-06-30 VITALS
SYSTOLIC BLOOD PRESSURE: 126 MMHG | WEIGHT: 190 LBS | TEMPERATURE: 98 F | BODY MASS INDEX: 26.6 KG/M2 | RESPIRATION RATE: 18 BRPM | DIASTOLIC BLOOD PRESSURE: 85 MMHG | OXYGEN SATURATION: 98 % | HEIGHT: 71 IN | HEART RATE: 73 BPM

## 2022-06-30 DIAGNOSIS — H61.23 BILATERAL IMPACTED CERUMEN: Primary | ICD-10-CM

## 2022-06-30 PROCEDURE — 99214 PR OFFICE/OUTPT VISIT, EST, LEVL IV, 30-39 MIN: ICD-10-PCS | Mod: 25,S$GLB,, | Performed by: NURSE PRACTITIONER

## 2022-06-30 PROCEDURE — 1160F PR REVIEW ALL MEDS BY PRESCRIBER/CLIN PHARMACIST DOCUMENTED: ICD-10-PCS | Mod: CPTII,S$GLB,, | Performed by: NURSE PRACTITIONER

## 2022-06-30 PROCEDURE — 3044F HG A1C LEVEL LT 7.0%: CPT | Mod: CPTII,S$GLB,, | Performed by: NURSE PRACTITIONER

## 2022-06-30 PROCEDURE — 69209 REMOVE IMPACTED EAR WAX UNI: CPT | Mod: 50,S$GLB,, | Performed by: NURSE PRACTITIONER

## 2022-06-30 PROCEDURE — 3008F PR BODY MASS INDEX (BMI) DOCUMENTED: ICD-10-PCS | Mod: CPTII,S$GLB,, | Performed by: NURSE PRACTITIONER

## 2022-06-30 PROCEDURE — 1160F RVW MEDS BY RX/DR IN RCRD: CPT | Mod: CPTII,S$GLB,, | Performed by: NURSE PRACTITIONER

## 2022-06-30 PROCEDURE — 99214 OFFICE O/P EST MOD 30 MIN: CPT | Mod: 25,S$GLB,, | Performed by: NURSE PRACTITIONER

## 2022-06-30 PROCEDURE — 3008F BODY MASS INDEX DOCD: CPT | Mod: CPTII,S$GLB,, | Performed by: NURSE PRACTITIONER

## 2022-06-30 PROCEDURE — 69209 EAR CERUMEN REMOVAL: ICD-10-PCS | Mod: 50,S$GLB,, | Performed by: NURSE PRACTITIONER

## 2022-06-30 PROCEDURE — 3074F SYST BP LT 130 MM HG: CPT | Mod: CPTII,S$GLB,, | Performed by: NURSE PRACTITIONER

## 2022-06-30 PROCEDURE — 1159F MED LIST DOCD IN RCRD: CPT | Mod: CPTII,S$GLB,, | Performed by: NURSE PRACTITIONER

## 2022-06-30 PROCEDURE — 3074F PR MOST RECENT SYSTOLIC BLOOD PRESSURE < 130 MM HG: ICD-10-PCS | Mod: CPTII,S$GLB,, | Performed by: NURSE PRACTITIONER

## 2022-06-30 PROCEDURE — 3079F DIAST BP 80-89 MM HG: CPT | Mod: CPTII,S$GLB,, | Performed by: NURSE PRACTITIONER

## 2022-06-30 PROCEDURE — 3079F PR MOST RECENT DIASTOLIC BLOOD PRESSURE 80-89 MM HG: ICD-10-PCS | Mod: CPTII,S$GLB,, | Performed by: NURSE PRACTITIONER

## 2022-06-30 PROCEDURE — 1159F PR MEDICATION LIST DOCUMENTED IN MEDICAL RECORD: ICD-10-PCS | Mod: CPTII,S$GLB,, | Performed by: NURSE PRACTITIONER

## 2022-06-30 PROCEDURE — 3044F PR MOST RECENT HEMOGLOBIN A1C LEVEL <7.0%: ICD-10-PCS | Mod: CPTII,S$GLB,, | Performed by: NURSE PRACTITIONER

## 2022-06-30 NOTE — PROCEDURES
"Ear Cerumen Removal    Date/Time: 6/30/2022 9:45 AM  Performed by: Vira Mccray MA  Authorized by: Mary Ricci NP     Time out: Immediately prior to procedure a "time out" was called to verify the correct patient, procedure, equipment, support staff and site/side marked as required.    Consent Done?:  Yes (Verbal)    Local anesthetic:  None  Medication Used:  Debrox  Location details:  Both ears  Procedure type: irrigation    Cerumen  Removal Results:  Cerumen completely removed  Patient tolerance:  Patient tolerated the procedure well with no immediate complications      "

## 2022-06-30 NOTE — PROGRESS NOTES
"Subjective:       Patient ID: Alexander Colmenares is a 41 y.o. male.    Vitals:  height is 5' 11" (1.803 m) and weight is 86.2 kg (190 lb). His temperature is 97.8 °F (36.6 °C). His blood pressure is 126/85 and his pulse is 73. His respiration is 18 and oxygen saturation is 98%.     Chief Complaint: Ear Fullness    Patient has had 3 days of bilateral ear discomfort and fullness after swimming.     Ear Fullness   There is pain in both ears. This is a new problem. The current episode started in the past 7 days. The problem has been gradually worsening. There has been no fever. Pertinent negatives include no abdominal pain, diarrhea, ear discharge, headaches, hearing loss, neck pain or rash. He has tried nothing for the symptoms.       HENT: Negative for ear discharge and hearing loss.    Neck: Negative for neck pain.   Gastrointestinal: Negative for abdominal pain and diarrhea.   Skin: Negative for rash.   Neurological: Negative for headaches.       Objective:      Physical Exam   Constitutional: He is oriented to person, place, and time. He appears well-developed. He is cooperative.  Non-toxic appearance. He does not appear ill. No distress.   HENT:   Head: Normocephalic and atraumatic.   Ears:   Right Ear: Hearing, tympanic membrane, external ear and ear canal normal. No tenderness. impacted cerumen  Left Ear: Hearing, tympanic membrane, external ear and ear canal normal. No tenderness. impacted cerumen  Nose: Nose normal. No mucosal edema, rhinorrhea or nasal deformity. No epistaxis. Right sinus exhibits no maxillary sinus tenderness and no frontal sinus tenderness. Left sinus exhibits no maxillary sinus tenderness and no frontal sinus tenderness.   Mouth/Throat: Uvula is midline, oropharynx is clear and moist and mucous membranes are normal. No trismus in the jaw. Normal dentition. No uvula swelling. No posterior oropharyngeal edema.   Eyes: Conjunctivae and lids are normal. No scleral icterus.   Neck: Trachea " normal and phonation normal. Neck supple. No edema present. No erythema present. No neck rigidity present.   Cardiovascular: Normal rate, regular rhythm and normal pulses.   Pulmonary/Chest: Effort normal. He has no decreased breath sounds.   Abdominal: Normal appearance.   Musculoskeletal: Normal range of motion.         General: No deformity. Normal range of motion.   Neurological: He is alert and oriented to person, place, and time. He exhibits normal muscle tone. Coordination normal.   Skin: Skin is warm, dry, intact, not diaphoretic and not pale.   Psychiatric: His speech is normal and behavior is normal. Judgment and thought content normal.   Nursing note and vitals reviewed.        Assessment:       1. Bilateral impacted cerumen          Plan:         Bilateral impacted cerumen  -     Ear wax removal  -     Ear Cerumen Removal

## 2022-07-03 ENCOUNTER — TELEPHONE (OUTPATIENT)
Dept: URGENT CARE | Facility: CLINIC | Age: 42
End: 2022-07-03
Payer: COMMERCIAL

## 2023-04-18 ENCOUNTER — PATIENT MESSAGE (OUTPATIENT)
Dept: SLEEP MEDICINE | Facility: CLINIC | Age: 43
End: 2023-04-18
Payer: COMMERCIAL

## 2023-04-18 DIAGNOSIS — G47.33 OSA (OBSTRUCTIVE SLEEP APNEA): Primary | ICD-10-CM

## 2023-04-19 ENCOUNTER — PATIENT MESSAGE (OUTPATIENT)
Dept: SLEEP MEDICINE | Facility: CLINIC | Age: 43
End: 2023-04-19
Payer: COMMERCIAL

## 2023-05-15 ENCOUNTER — OFFICE VISIT (OUTPATIENT)
Dept: INTERNAL MEDICINE | Facility: CLINIC | Age: 43
End: 2023-05-15
Attending: INTERNAL MEDICINE
Payer: COMMERCIAL

## 2023-05-15 ENCOUNTER — LAB VISIT (OUTPATIENT)
Dept: LAB | Facility: OTHER | Age: 43
End: 2023-05-15
Attending: INTERNAL MEDICINE
Payer: COMMERCIAL

## 2023-05-15 VITALS
WEIGHT: 189.63 LBS | HEART RATE: 72 BPM | OXYGEN SATURATION: 98 % | DIASTOLIC BLOOD PRESSURE: 72 MMHG | SYSTOLIC BLOOD PRESSURE: 114 MMHG | BODY MASS INDEX: 26.55 KG/M2 | HEIGHT: 71 IN

## 2023-05-15 DIAGNOSIS — Z13.6 ENCOUNTER FOR SCREENING FOR CARDIOVASCULAR DISORDERS: ICD-10-CM

## 2023-05-15 DIAGNOSIS — Z00.00 ANNUAL PHYSICAL EXAM: ICD-10-CM

## 2023-05-15 DIAGNOSIS — Z80.52 FAMILY HISTORY OF BLADDER CANCER: ICD-10-CM

## 2023-05-15 DIAGNOSIS — Z82.79 FAMILY HISTORY OF BICUSPID AORTIC VALVE: ICD-10-CM

## 2023-05-15 DIAGNOSIS — Z00.00 ANNUAL PHYSICAL EXAM: Primary | ICD-10-CM

## 2023-05-15 LAB
ALBUMIN SERPL BCP-MCNC: 4.5 G/DL (ref 3.5–5.2)
ALP SERPL-CCNC: 27 U/L (ref 55–135)
ALT SERPL W/O P-5'-P-CCNC: 17 U/L (ref 10–44)
ANION GAP SERPL CALC-SCNC: 7 MMOL/L (ref 8–16)
AST SERPL-CCNC: 16 U/L (ref 10–40)
BASOPHILS # BLD AUTO: 0.06 K/UL (ref 0–0.2)
BASOPHILS NFR BLD: 1.2 % (ref 0–1.9)
BILIRUB SERPL-MCNC: 0.6 MG/DL (ref 0.1–1)
BUN SERPL-MCNC: 9 MG/DL (ref 6–20)
CALCIUM SERPL-MCNC: 9.8 MG/DL (ref 8.7–10.5)
CHLORIDE SERPL-SCNC: 108 MMOL/L (ref 95–110)
CHOLEST SERPL-MCNC: 228 MG/DL (ref 120–199)
CHOLEST/HDLC SERPL: 4.9 {RATIO} (ref 2–5)
CO2 SERPL-SCNC: 26 MMOL/L (ref 23–29)
CREAT SERPL-MCNC: 1.1 MG/DL (ref 0.5–1.4)
DIFFERENTIAL METHOD: NORMAL
EOSINOPHIL # BLD AUTO: 0.3 K/UL (ref 0–0.5)
EOSINOPHIL NFR BLD: 5.5 % (ref 0–8)
ERYTHROCYTE [DISTWIDTH] IN BLOOD BY AUTOMATED COUNT: 12.6 % (ref 11.5–14.5)
EST. GFR  (NO RACE VARIABLE): >60 ML/MIN/1.73 M^2
ESTIMATED AVG GLUCOSE: 94 MG/DL (ref 68–131)
GLUCOSE SERPL-MCNC: 85 MG/DL (ref 70–110)
HBA1C MFR BLD: 4.9 % (ref 4–5.6)
HCT VFR BLD AUTO: 46.7 % (ref 40–54)
HDLC SERPL-MCNC: 47 MG/DL (ref 40–75)
HDLC SERPL: 20.6 % (ref 20–50)
HGB BLD-MCNC: 15.3 G/DL (ref 14–18)
IMM GRANULOCYTES # BLD AUTO: 0.01 K/UL (ref 0–0.04)
IMM GRANULOCYTES NFR BLD AUTO: 0.2 % (ref 0–0.5)
LDLC SERPL CALC-MCNC: 157.2 MG/DL (ref 63–159)
LYMPHOCYTES # BLD AUTO: 1.5 K/UL (ref 1–4.8)
LYMPHOCYTES NFR BLD: 29.5 % (ref 18–48)
MCH RBC QN AUTO: 28.2 PG (ref 27–31)
MCHC RBC AUTO-ENTMCNC: 32.8 G/DL (ref 32–36)
MCV RBC AUTO: 86 FL (ref 82–98)
MONOCYTES # BLD AUTO: 0.5 K/UL (ref 0.3–1)
MONOCYTES NFR BLD: 9.4 % (ref 4–15)
NEUTROPHILS # BLD AUTO: 2.8 K/UL (ref 1.8–7.7)
NEUTROPHILS NFR BLD: 54.2 % (ref 38–73)
NONHDLC SERPL-MCNC: 181 MG/DL
NRBC BLD-RTO: 0 /100 WBC
PLATELET # BLD AUTO: 261 K/UL (ref 150–450)
PMV BLD AUTO: 9.4 FL (ref 9.2–12.9)
POTASSIUM SERPL-SCNC: 4.3 MMOL/L (ref 3.5–5.1)
PROT SERPL-MCNC: 7.2 G/DL (ref 6–8.4)
RBC # BLD AUTO: 5.43 M/UL (ref 4.6–6.2)
SODIUM SERPL-SCNC: 141 MMOL/L (ref 136–145)
TRIGL SERPL-MCNC: 119 MG/DL (ref 30–150)
TSH SERPL DL<=0.005 MIU/L-ACNC: 1.23 UIU/ML (ref 0.4–4)
WBC # BLD AUTO: 5.08 K/UL (ref 3.9–12.7)

## 2023-05-15 PROCEDURE — 3008F PR BODY MASS INDEX (BMI) DOCUMENTED: ICD-10-PCS | Mod: CPTII,S$GLB,, | Performed by: INTERNAL MEDICINE

## 2023-05-15 PROCEDURE — 80053 COMPREHEN METABOLIC PANEL: CPT | Performed by: INTERNAL MEDICINE

## 2023-05-15 PROCEDURE — 1159F PR MEDICATION LIST DOCUMENTED IN MEDICAL RECORD: ICD-10-PCS | Mod: CPTII,S$GLB,, | Performed by: INTERNAL MEDICINE

## 2023-05-15 PROCEDURE — 80061 LIPID PANEL: CPT | Performed by: INTERNAL MEDICINE

## 2023-05-15 PROCEDURE — 83036 HEMOGLOBIN GLYCOSYLATED A1C: CPT | Performed by: INTERNAL MEDICINE

## 2023-05-15 PROCEDURE — 3074F SYST BP LT 130 MM HG: CPT | Mod: CPTII,S$GLB,, | Performed by: INTERNAL MEDICINE

## 2023-05-15 PROCEDURE — 84443 ASSAY THYROID STIM HORMONE: CPT | Performed by: INTERNAL MEDICINE

## 2023-05-15 PROCEDURE — 3074F PR MOST RECENT SYSTOLIC BLOOD PRESSURE < 130 MM HG: ICD-10-PCS | Mod: CPTII,S$GLB,, | Performed by: INTERNAL MEDICINE

## 2023-05-15 PROCEDURE — 1159F MED LIST DOCD IN RCRD: CPT | Mod: CPTII,S$GLB,, | Performed by: INTERNAL MEDICINE

## 2023-05-15 PROCEDURE — 85025 COMPLETE CBC W/AUTO DIFF WBC: CPT | Performed by: INTERNAL MEDICINE

## 2023-05-15 PROCEDURE — 1160F RVW MEDS BY RX/DR IN RCRD: CPT | Mod: CPTII,S$GLB,, | Performed by: INTERNAL MEDICINE

## 2023-05-15 PROCEDURE — 99999 PR PBB SHADOW E&M-EST. PATIENT-LVL III: CPT | Mod: PBBFAC,,, | Performed by: INTERNAL MEDICINE

## 2023-05-15 PROCEDURE — 99999 PR PBB SHADOW E&M-EST. PATIENT-LVL III: ICD-10-PCS | Mod: PBBFAC,,, | Performed by: INTERNAL MEDICINE

## 2023-05-15 PROCEDURE — 99396 PREV VISIT EST AGE 40-64: CPT | Mod: S$GLB,,, | Performed by: INTERNAL MEDICINE

## 2023-05-15 PROCEDURE — 3008F BODY MASS INDEX DOCD: CPT | Mod: CPTII,S$GLB,, | Performed by: INTERNAL MEDICINE

## 2023-05-15 PROCEDURE — 3078F DIAST BP <80 MM HG: CPT | Mod: CPTII,S$GLB,, | Performed by: INTERNAL MEDICINE

## 2023-05-15 PROCEDURE — 36415 COLL VENOUS BLD VENIPUNCTURE: CPT | Performed by: INTERNAL MEDICINE

## 2023-05-15 PROCEDURE — 1160F PR REVIEW ALL MEDS BY PRESCRIBER/CLIN PHARMACIST DOCUMENTED: ICD-10-PCS | Mod: CPTII,S$GLB,, | Performed by: INTERNAL MEDICINE

## 2023-05-15 PROCEDURE — 99396 PR PREVENTIVE VISIT,EST,40-64: ICD-10-PCS | Mod: S$GLB,,, | Performed by: INTERNAL MEDICINE

## 2023-05-15 PROCEDURE — 3078F PR MOST RECENT DIASTOLIC BLOOD PRESSURE < 80 MM HG: ICD-10-PCS | Mod: CPTII,S$GLB,, | Performed by: INTERNAL MEDICINE

## 2023-05-15 NOTE — PROGRESS NOTES
"Subjective:       Patient ID: Alexander Colmenares is a 42 y.o. male.    Chief Complaint: Annual Exam    Here for annual exam    Patient presents today for routine evaluation, physical, and labs. Patient has no major concerns or complaints today.     Energy levels are up. Working out more consistently.     ### YAO ###  Using 50% of the time       Checking at home and is well controlled.      LDL range of 130-160.      Family hx:  MGF CVA and MI  Father with hx of bicuspid valve requiring surgery. Pt with normal ECHO as of 06/2021    Amenable to coronary calcium score      The 10-year ASCVD risk score (Etelvina RAMESH, et al., 2019) is: 1.9%    Values used to calculate the score:      Age: 42 years      Sex: Male      Is Non- : No      Diabetic: No      Tobacco smoker: No      Systolic Blood Pressure: 114 mmHg      Is BP treated: No      HDL Cholesterol: 54 mg/dL      Total Cholesterol: 273 mg/dL        Review of Systems   Constitutional:  Negative for appetite change, chills, fever and unexpected weight change.   HENT:  Negative for hearing loss, sore throat and trouble swallowing.    Eyes:  Negative for visual disturbance.   Respiratory:  Negative for cough, chest tightness and shortness of breath.    Cardiovascular:  Negative for chest pain and leg swelling.   Gastrointestinal:  Negative for abdominal pain, blood in stool, constipation, diarrhea, nausea and vomiting.   Endocrine: Negative for polydipsia and polyuria.   Genitourinary:  Negative for decreased urine volume, difficulty urinating, dysuria, frequency and urgency.   Musculoskeletal:  Negative for gait problem.   Skin:  Negative for rash.   Neurological:  Negative for dizziness and numbness.   Psychiatric/Behavioral:  The patient is not nervous/anxious.      Objective:      Vitals:    05/15/23 0859   BP: 114/72   Pulse: 72   SpO2: 98%   Weight: 86 kg (189 lb 9.5 oz)   Height: 5' 11" (1.803 m)      Physical Exam  Vitals and nursing note " reviewed.   Constitutional:       General: He is not in acute distress.     Appearance: Normal appearance. He is well-developed.   HENT:      Head: Normocephalic and atraumatic.      Mouth/Throat:      Pharynx: No oropharyngeal exudate.   Eyes:      General: No scleral icterus.     Conjunctiva/sclera: Conjunctivae normal.      Pupils: Pupils are equal, round, and reactive to light.   Neck:      Thyroid: No thyromegaly.   Cardiovascular:      Rate and Rhythm: Normal rate and regular rhythm.      Heart sounds: Normal heart sounds. No murmur heard.  Pulmonary:      Effort: Pulmonary effort is normal.      Breath sounds: Normal breath sounds. No wheezing or rales.   Abdominal:      General: There is no distension.   Musculoskeletal:         General: No tenderness.   Lymphadenopathy:      Cervical: No cervical adenopathy.   Skin:     General: Skin is warm and dry.   Neurological:      Mental Status: He is alert and oriented to person, place, and time.   Psychiatric:         Behavior: Behavior normal.       Assessment:       1. Annual physical exam    2. Family history of bicuspid aortic valve    3. Family history of bladder cancer    4. Encounter for screening for cardiovascular disorders        Plan:       Alexander was seen today for annual exam.    Diagnoses and all orders for this visit:    Annual physical exam  -     CBC Auto Differential; Future  -     Comprehensive Metabolic Panel; Future  -     Hemoglobin A1C; Future  -     Lipid Panel; Future  -     TSH; Future    Family history of bicuspid aortic valve   ECHO normal   Family history of bladder cancer    Encounter for screening for cardiovascular disorders  -     CT Cardiac Scoring; Future           Henrique Mancera MD  Internal Medicine-[facility]        Side effects of medication(s) were discussed in detail and patient voiced understanding.  Patient will call back for any issues or complications.

## 2023-05-23 ENCOUNTER — HOSPITAL ENCOUNTER (OUTPATIENT)
Dept: RADIOLOGY | Facility: HOSPITAL | Age: 43
Discharge: HOME | End: 2023-05-23
Attending: INTERNAL MEDICINE
Payer: COMMERCIAL

## 2023-05-23 DIAGNOSIS — Z13.6 ENCOUNTER FOR SCREENING FOR CARDIOVASCULAR DISORDERS: ICD-10-CM

## 2023-05-23 PROCEDURE — 75571 CT HRT W/O DYE W/CA TEST: CPT | Mod: TC

## 2023-05-23 PROCEDURE — 75571 CT CALCIUM SCORING CARDIAC: ICD-10-PCS | Mod: 26,,, | Performed by: RADIOLOGY

## 2023-05-23 PROCEDURE — 75571 CT HRT W/O DYE W/CA TEST: CPT | Mod: 26,,, | Performed by: RADIOLOGY

## 2023-05-23 NOTE — PROGRESS NOTES
Your total calcium score is 330, which is between the 90th and 100th percentile for males between the ages of 40 and 44.  This means that 90% of people this age and gender head less calcium than was detected in this study. Let's start with lipitor 80mg and try to get your LDL < 70. Most of your life scoring 90% and above lead to rewards, but here it leads to a stress test. Sorry. Please let me know when you would like to schedule this by responding to this message and it will go to my support staff. If you have additional questions or concerns they will forward those on to me.    Respectfully,  Henrique Mancera

## 2023-06-11 ENCOUNTER — PATIENT MESSAGE (OUTPATIENT)
Dept: INTERNAL MEDICINE | Facility: CLINIC | Age: 43
End: 2023-06-11
Payer: COMMERCIAL

## 2023-06-22 ENCOUNTER — PATIENT MESSAGE (OUTPATIENT)
Dept: INTERNAL MEDICINE | Facility: CLINIC | Age: 43
End: 2023-06-22
Payer: COMMERCIAL

## 2023-06-22 ENCOUNTER — OFFICE VISIT (OUTPATIENT)
Dept: CARDIOLOGY | Facility: CLINIC | Age: 43
End: 2023-06-22
Payer: COMMERCIAL

## 2023-06-22 ENCOUNTER — HOSPITAL ENCOUNTER (OUTPATIENT)
Dept: CARDIOLOGY | Facility: HOSPITAL | Age: 43
Discharge: HOME | End: 2023-06-22
Attending: INTERNAL MEDICINE
Payer: COMMERCIAL

## 2023-06-22 VITALS
RESPIRATION RATE: 18 BRPM | OXYGEN SATURATION: 95 % | BODY MASS INDEX: 25.44 KG/M2 | HEIGHT: 71 IN | DIASTOLIC BLOOD PRESSURE: 78 MMHG | WEIGHT: 181.75 LBS | HEART RATE: 74 BPM | SYSTOLIC BLOOD PRESSURE: 115 MMHG

## 2023-06-22 VITALS — BODY MASS INDEX: 26.46 KG/M2 | WEIGHT: 189 LBS | HEIGHT: 71 IN

## 2023-06-22 DIAGNOSIS — R93.1 AGATSTON CORONARY ARTERY CALCIUM SCORE BETWEEN 200 AND 399: ICD-10-CM

## 2023-06-22 DIAGNOSIS — Z82.79 FAMILY HISTORY OF BICUSPID AORTIC VALVE: ICD-10-CM

## 2023-06-22 DIAGNOSIS — I10 HYPERTENSION, UNSPECIFIED TYPE: ICD-10-CM

## 2023-06-22 DIAGNOSIS — I25.119 CORONARY ARTERY DISEASE INVOLVING NATIVE CORONARY ARTERY OF NATIVE HEART WITH ANGINA PECTORIS: ICD-10-CM

## 2023-06-22 DIAGNOSIS — R93.1 AGATSTON CORONARY ARTERY CALCIUM SCORE BETWEEN 200 AND 399: Primary | ICD-10-CM

## 2023-06-22 LAB
AV INDEX (PROSTH): 0.83
AV MEAN GRADIENT: 2 MMHG
AV PEAK GRADIENT: 3 MMHG
AV VALVE AREA: 3.43 CM2
AV VELOCITY RATIO: 0.88
BSA FOR ECHO PROCEDURE: 2.07 M2
CV ECHO LV RWT: 0.38 CM
DOP CALC AO PEAK VEL: 0.86 M/S
DOP CALC AO VTI: 18.1 CM
DOP CALC LVOT AREA: 4.1 CM2
DOP CALC LVOT DIAMETER: 2.29 CM
DOP CALC LVOT PEAK VEL: 0.76 M/S
DOP CALC LVOT STROKE VOLUME: 62.16 CM3
DOP CALC MV VTI: 12.3 CM
DOP CALCLVOT PEAK VEL VTI: 15.1 CM
E WAVE DECELERATION TIME: 214.89 MSEC
E/A RATIO: 1.08
E/E' RATIO: 6.33 M/S
ECHO LV POSTERIOR WALL: 0.97 CM (ref 0.6–1.1)
EJECTION FRACTION: 55 %
FRACTIONAL SHORTENING: 37 % (ref 28–44)
INTERVENTRICULAR SEPTUM: 0.96 CM (ref 0.6–1.1)
IVC DIAMETER: 1.63 CM
LA MAJOR: 3.4 CM
LA MINOR: 3.4 CM
LA WIDTH: 2.9 CM
LEFT ATRIUM SIZE: 2.38 CM
LEFT ATRIUM VOLUME INDEX: 9.7 ML/M2
LEFT ATRIUM VOLUME: 19.95 CM3
LEFT INTERNAL DIMENSION IN SYSTOLE: 3.21 CM (ref 2.1–4)
LEFT VENTRICLE DIASTOLIC VOLUME INDEX: 59.36 ML/M2
LEFT VENTRICLE DIASTOLIC VOLUME: 122.29 ML
LEFT VENTRICLE MASS INDEX: 86 G/M2
LEFT VENTRICLE SYSTOLIC VOLUME INDEX: 20 ML/M2
LEFT VENTRICLE SYSTOLIC VOLUME: 41.17 ML
LEFT VENTRICULAR INTERNAL DIMENSION IN DIASTOLE: 5.07 CM (ref 3.5–6)
LEFT VENTRICULAR MASS: 177.55 G
LV LATERAL E/E' RATIO: 5.7 M/S
LV SEPTAL E/E' RATIO: 7.13 M/S
LVOT MG: 1.33 MMHG
LVOT MV: 0.54 CM/S
MV MEAN GRADIENT: 1 MMHG
MV PEAK A VEL: 0.53 M/S
MV PEAK E VEL: 0.57 M/S
MV PEAK GRADIENT: 1 MMHG
MV STENOSIS PRESSURE HALF TIME: 62.32 MS
MV VALVE AREA BY CONTINUITY EQUATION: 5.05 CM2
MV VALVE AREA P 1/2 METHOD: 3.53 CM2
PV PEAK VELOCITY: 0.74 CM/S
RA MAJOR: 4.26 CM
RA PRESSURE: 15 MMHG
RA WIDTH: 3.2 CM
RV TISSUE DOPPLER FREE WALL SYSTOLIC VELOCITY 1 (APICAL 4 CHAMBER VIEW): 14.21 CM/S
SINUS: 3.01 CM
STJ: 2.67 CM
TDI LATERAL: 0.1 M/S
TDI SEPTAL: 0.08 M/S
TDI: 0.09 M/S

## 2023-06-22 PROCEDURE — 99205 OFFICE O/P NEW HI 60 MIN: CPT | Mod: 25,S$GLB,, | Performed by: INTERNAL MEDICINE

## 2023-06-22 PROCEDURE — 93000 ELECTROCARDIOGRAM COMPLETE: CPT | Mod: S$GLB,,, | Performed by: INTERNAL MEDICINE

## 2023-06-22 PROCEDURE — 99205 PR OFFICE/OUTPT VISIT, NEW, LEVL V, 60-74 MIN: ICD-10-PCS | Mod: 25,S$GLB,, | Performed by: INTERNAL MEDICINE

## 2023-06-22 PROCEDURE — 3078F PR MOST RECENT DIASTOLIC BLOOD PRESSURE < 80 MM HG: ICD-10-PCS | Mod: CPTII,S$GLB,, | Performed by: INTERNAL MEDICINE

## 2023-06-22 PROCEDURE — 93306 TTE W/DOPPLER COMPLETE: CPT | Mod: 26,,, | Performed by: INTERNAL MEDICINE

## 2023-06-22 PROCEDURE — 93000 EKG 12-LEAD: ICD-10-PCS | Mod: S$GLB,,, | Performed by: INTERNAL MEDICINE

## 2023-06-22 PROCEDURE — 1160F PR REVIEW ALL MEDS BY PRESCRIBER/CLIN PHARMACIST DOCUMENTED: ICD-10-PCS | Mod: CPTII,S$GLB,, | Performed by: INTERNAL MEDICINE

## 2023-06-22 PROCEDURE — 3044F PR MOST RECENT HEMOGLOBIN A1C LEVEL <7.0%: ICD-10-PCS | Mod: CPTII,S$GLB,, | Performed by: INTERNAL MEDICINE

## 2023-06-22 PROCEDURE — 3074F PR MOST RECENT SYSTOLIC BLOOD PRESSURE < 130 MM HG: ICD-10-PCS | Mod: CPTII,S$GLB,, | Performed by: INTERNAL MEDICINE

## 2023-06-22 PROCEDURE — 93306 TTE W/DOPPLER COMPLETE: CPT

## 2023-06-22 PROCEDURE — 3044F HG A1C LEVEL LT 7.0%: CPT | Mod: CPTII,S$GLB,, | Performed by: INTERNAL MEDICINE

## 2023-06-22 PROCEDURE — 1159F PR MEDICATION LIST DOCUMENTED IN MEDICAL RECORD: ICD-10-PCS | Mod: CPTII,S$GLB,, | Performed by: INTERNAL MEDICINE

## 2023-06-22 PROCEDURE — 3074F SYST BP LT 130 MM HG: CPT | Mod: CPTII,S$GLB,, | Performed by: INTERNAL MEDICINE

## 2023-06-22 PROCEDURE — 99999 PR PBB SHADOW E&M-EST. PATIENT-LVL IV: ICD-10-PCS | Mod: PBBFAC,,, | Performed by: INTERNAL MEDICINE

## 2023-06-22 PROCEDURE — 3008F BODY MASS INDEX DOCD: CPT | Mod: CPTII,S$GLB,, | Performed by: INTERNAL MEDICINE

## 2023-06-22 PROCEDURE — 1160F RVW MEDS BY RX/DR IN RCRD: CPT | Mod: CPTII,S$GLB,, | Performed by: INTERNAL MEDICINE

## 2023-06-22 PROCEDURE — 3078F DIAST BP <80 MM HG: CPT | Mod: CPTII,S$GLB,, | Performed by: INTERNAL MEDICINE

## 2023-06-22 PROCEDURE — 93306 ECHO (CUPID ONLY): ICD-10-PCS | Mod: 26,,, | Performed by: INTERNAL MEDICINE

## 2023-06-22 PROCEDURE — 3008F PR BODY MASS INDEX (BMI) DOCUMENTED: ICD-10-PCS | Mod: CPTII,S$GLB,, | Performed by: INTERNAL MEDICINE

## 2023-06-22 PROCEDURE — 1159F MED LIST DOCD IN RCRD: CPT | Mod: CPTII,S$GLB,, | Performed by: INTERNAL MEDICINE

## 2023-06-22 PROCEDURE — 99999 PR PBB SHADOW E&M-EST. PATIENT-LVL IV: CPT | Mod: PBBFAC,,, | Performed by: INTERNAL MEDICINE

## 2023-06-22 RX ORDER — SODIUM CHLORIDE 0.9 % (FLUSH) 0.9 %
10 SYRINGE (ML) INJECTION
Status: SHIPPED | OUTPATIENT
Start: 2023-06-22

## 2023-06-22 RX ORDER — SODIUM CHLORIDE 9 MG/ML
INJECTION, SOLUTION INTRAVENOUS CONTINUOUS
Status: CANCELLED | OUTPATIENT
Start: 2023-06-27 | End: 2023-06-27

## 2023-06-22 RX ORDER — DIPHENHYDRAMINE HCL 25 MG
50 CAPSULE ORAL ONCE
Status: CANCELLED | OUTPATIENT
Start: 2023-06-27

## 2023-06-22 RX ORDER — ASPIRIN 81 MG/1
81 TABLET ORAL DAILY
Qty: 90 TABLET | Refills: 3 | COMMUNITY
Start: 2023-06-22

## 2023-06-22 RX ORDER — CLOPIDOGREL BISULFATE 75 MG/1
75 TABLET ORAL DAILY
Qty: 30 TABLET | Refills: 11 | Status: ON HOLD | OUTPATIENT
Start: 2023-06-22 | End: 2023-06-27 | Stop reason: HOSPADM

## 2023-06-22 NOTE — H&P (VIEW-ONLY)
CARDIOVASCULAR CONSULTATION    REASON FOR CONSULT:   Alexander Colmenares is a 42 y.o. male who presents for CAD.    PCP: [facility]  HISTORY OF PRESENT ILLNESS:   The patient is a pleasant 42-year-old  presenting for evaluation of chest discomfort and an elevated coronary calcium score.  He relays nondescript exertional symptoms, and can not entirely put his finger on the symptoms.  He feels that he is more short of breath recently as well.  He also describes occasional palpitations without syncope.  There has been no PND, orthopnea, or lower extremity edema.  He denies melena, hematuria, or claudicant symptoms.      His past medical history is notable for dyslipidemia and recent coronary calcium score was notable for a score of 330 in the greater than 90th percentile.  Review of these images notes significant plaque focused in the proximal LAD.    Family history is notable for father with bicuspid aortic valve status post AVR.  The patient's mother's family has a significant cardiovascular history.  The patient's mother has a high calcium score but no history of premature CAD.      The patient denies tobacco use, alcohol excess, or illicit drug use.  Works as an  locally.    CARDIOVASCULAR HISTORY:   CAD, presumed (Ca++ score 330, 90th %ile 5/2023)    PAST MEDICAL HISTORY:     Past Medical History:   Diagnosis Date    Hyperlipidemia        PAST SURGICAL HISTORY:     Past Surgical History:   Procedure Laterality Date    WISDOM TOOTH EXTRACTION         ALLERGIES AND MEDICATION:   Review of patient's allergies indicates:  No Known Allergies     Medication List            Accurate as of June 22, 2023  1:02 PM. If you have any questions, ask your nurse or doctor.                CONTINUE taking these medications      atorvastatin 80 MG tablet  Commonly known as: LIPITOR  Take 1 tablet (80 mg total) by mouth once daily.              SOCIAL HISTORY:     Social History     Socioeconomic History    Marital  status:     Number of children: 2   Tobacco Use    Smoking status: Never    Smokeless tobacco: Never   Substance and Sexual Activity    Alcohol use: Yes     Alcohol/week: 7.0 standard drinks     Types: 7 Cans of beer per week    Drug use: No    Sexual activity: Yes     Partners: Female   Social History Narrative     with 2 children,  in commercial litigation.        FAMILY HISTORY:     Family History   Problem Relation Age of Onset    Hypertension Mother     Hyperlipidemia Mother     Hyperlipidemia Father     Valvular heart disease Father     Bladder Cancer Father     Hyperlipidemia Maternal Grandmother     Heart disease Maternal Grandmother     Heart disease Maternal Grandfather     Hyperlipidemia Maternal Grandfather     Hyperlipidemia Paternal Grandmother     Heart disease Paternal Grandmother     Hyperlipidemia Paternal Grandfather     Heart disease Paternal Grandfather        REVIEW OF SYSTEMS:   Review of Systems   Constitutional:  Negative for chills, diaphoresis and fever.   HENT:  Negative for nosebleeds.    Eyes:  Negative for blurred vision, double vision and photophobia.   Respiratory:  Positive for shortness of breath. Negative for hemoptysis and wheezing.    Cardiovascular:  Positive for chest pain and palpitations. Negative for orthopnea, claudication, leg swelling and PND.   Gastrointestinal:  Negative for abdominal pain, blood in stool, heartburn, melena, nausea and vomiting.   Genitourinary:  Negative for flank pain and hematuria.   Musculoskeletal:  Negative for falls, myalgias and neck pain.   Skin:  Negative for rash.   Neurological:  Negative for dizziness, seizures, loss of consciousness, weakness and headaches.   Endo/Heme/Allergies:  Negative for polydipsia. Does not bruise/bleed easily.   Psychiatric/Behavioral:  Negative for depression and memory loss. The patient is not nervous/anxious.      PHYSICAL EXAM:     Vitals:    06/22/23 1254   BP: 115/78   Pulse: 74   Resp:  "18    Body mass index is 25.35 kg/m².  Weight: 82.5 kg (181 lb 12.3 oz)   Height: 5' 11" (180.3 cm)     Physical Exam  Vitals reviewed.   Constitutional:       General: He is not in acute distress.     Appearance: Normal appearance. He is well-developed and normal weight. He is not ill-appearing, toxic-appearing or diaphoretic.   HENT:      Head: Normocephalic and atraumatic.   Eyes:      General: No scleral icterus.     Extraocular Movements: Extraocular movements intact.      Conjunctiva/sclera: Conjunctivae normal.      Pupils: Pupils are equal, round, and reactive to light.   Neck:      Thyroid: No thyromegaly.      Vascular: Normal carotid pulses. No carotid bruit or JVD.      Trachea: Trachea normal.   Cardiovascular:      Rate and Rhythm: Normal rate and regular rhythm.      Pulses:           Carotid pulses are 2+ on the right side and 2+ on the left side.       Radial pulses are 2+ on the right side.      Heart sounds: S1 normal and S2 normal. No murmur heard.    No friction rub. No gallop.   Pulmonary:      Effort: Pulmonary effort is normal. No respiratory distress.      Breath sounds: Normal breath sounds. No stridor. No wheezing, rhonchi or rales.   Chest:      Chest wall: No tenderness.   Abdominal:      General: There is no distension.      Palpations: Abdomen is soft.   Musculoskeletal:         General: No swelling or tenderness. Normal range of motion.      Cervical back: Normal range of motion and neck supple. No edema or rigidity.      Right lower leg: No edema.      Left lower leg: No edema.   Feet:      Right foot:      Skin integrity: No ulcer.      Left foot:      Skin integrity: No ulcer.   Skin:     General: Skin is warm and dry.      Coloration: Skin is not jaundiced.   Neurological:      General: No focal deficit present.      Mental Status: He is alert and oriented to person, place, and time.      Cranial Nerves: No cranial nerve deficit.   Psychiatric:         Mood and Affect: Mood " normal.         Speech: Speech normal.         Behavior: Behavior normal. Behavior is cooperative.       DATA:   EKG: (personally reviewed tracing)  6/22/23 SR 64    Laboratory:  CBC:  Recent Labs   Lab 05/31/21  1103 05/16/22  1229 05/15/23  0935   WBC 5.69 5.16 5.08   Hemoglobin 16.2 16.0 15.3   Hematocrit 50.7 49.1 46.7   Platelets 269 261 261       CHEMISTRIES:  Recent Labs   Lab 05/31/21  1103 05/16/22  1229 05/15/23  0935   Glucose 76 84 85   Sodium 139 143 141   Potassium 4.1 4.4 4.3   BUN 13 9 9   Creatinine 1.0 1.0 1.1   eGFR if non  >60 >60  --    eGFR  --   --  >60   Calcium 9.8 9.8 9.8       CARDIAC BIOMARKERS:        COAGS:        LIPIDS/LFTS:  Recent Labs   Lab 05/31/21  1103 05/16/22  1229 05/15/23  0935   Cholesterol 232 H 273 H 228 H   Triglycerides 206 H 236 H 119   HDL 49 54 47   LDL Cholesterol 141.8 171.8 H 157.2   Non-HDL Cholesterol 183 219 181   AST 17 17 16   ALT 16 24 17     The 10-year ASCVD risk score (Etelvina RAMESH, et al., 2019) is: 1.6%    Values used to calculate the score:      Age: 42 years      Sex: Male      Is Non- : No      Diabetic: No      Tobacco smoker: No      Systolic Blood Pressure: 115 mmHg      Is BP treated: No      HDL Cholesterol: 47 mg/dL      Total Cholesterol: 228 mg/dL      Cardiovascular Testing:  Cardiac Ca++ score 5/23/23  Your total calcium score is 330.  Moderate to high coronary heart disease risk.            Echo 6/8/21  The estimated ejection fraction is 60%.  Normal left ventricular diastolic function.  The left ventricle is normal in size with normal systolic function.  Normal right ventricular size with normal right ventricular systolic function.    ASSESSMENT:   # atyp CP with elev Ca++ score with plaque focused in prox LAD.  High anatomical risk.  # HLP on atorva 80mg  # FH bicuspid aortic valve    PLAN:   We had an extensive discussion regarding the pros and cons of noninvasive versus invasive ischemic testing.   We have decided to move forward with angiography as this is the gold standard.  I do not think cor CTA will be useful with such a high Ca++ score.    Cath 6/27/23 12pm, R rad access  Start Plavix 75mg qd  Cont ASA 81mg qd  Check echo  Follow up post cath    Risks, benefits and alternatives of the catheterization procedure were discussed with the patient which include but are not limited to: bleeding, infection, death, heart attack, arrhythmia, kidney failure, stroke, need for emergency surgery, etc.  Patient understands and and agrees to proceed.  Consent was placed on the chart.        Vincent Goff MD, FACC

## 2023-06-23 ENCOUNTER — HOSPITAL ENCOUNTER (OUTPATIENT)
Dept: PREADMISSION TESTING | Facility: HOSPITAL | Age: 43
Discharge: HOME | End: 2023-06-23
Attending: INTERNAL MEDICINE
Payer: COMMERCIAL

## 2023-06-23 VITALS
HEART RATE: 71 BPM | SYSTOLIC BLOOD PRESSURE: 116 MMHG | RESPIRATION RATE: 16 BRPM | WEIGHT: 183.19 LBS | TEMPERATURE: 97 F | HEIGHT: 71 IN | DIASTOLIC BLOOD PRESSURE: 74 MMHG | OXYGEN SATURATION: 97 % | BODY MASS INDEX: 25.65 KG/M2

## 2023-06-23 DIAGNOSIS — I25.119 CORONARY ARTERY DISEASE INVOLVING NATIVE CORONARY ARTERY OF NATIVE HEART WITH ANGINA PECTORIS: ICD-10-CM

## 2023-06-23 DIAGNOSIS — R93.1 AGATSTON CORONARY ARTERY CALCIUM SCORE BETWEEN 200 AND 399: ICD-10-CM

## 2023-06-23 LAB
ANION GAP SERPL CALC-SCNC: 11 MMOL/L (ref 8–16)
BASOPHILS # BLD AUTO: 0.06 K/UL (ref 0–0.2)
BASOPHILS NFR BLD: 1.1 % (ref 0–1.9)
BUN SERPL-MCNC: 11 MG/DL (ref 6–20)
CALCIUM SERPL-MCNC: 10.4 MG/DL (ref 8.7–10.5)
CHLORIDE SERPL-SCNC: 104 MMOL/L (ref 95–110)
CO2 SERPL-SCNC: 27 MMOL/L (ref 23–29)
CREAT SERPL-MCNC: 0.9 MG/DL (ref 0.5–1.4)
DIFFERENTIAL METHOD: NORMAL
EOSINOPHIL # BLD AUTO: 0.2 K/UL (ref 0–0.5)
EOSINOPHIL NFR BLD: 3.4 % (ref 0–8)
ERYTHROCYTE [DISTWIDTH] IN BLOOD BY AUTOMATED COUNT: 12.5 % (ref 11.5–14.5)
EST. GFR  (NO RACE VARIABLE): >60 ML/MIN/1.73 M^2
GLUCOSE SERPL-MCNC: 81 MG/DL (ref 70–110)
HCT VFR BLD AUTO: 46.8 % (ref 40–54)
HGB BLD-MCNC: 15.4 G/DL (ref 14–18)
IMM GRANULOCYTES # BLD AUTO: 0 K/UL (ref 0–0.04)
IMM GRANULOCYTES NFR BLD AUTO: 0 % (ref 0–0.5)
INR PPP: 1.1 (ref 0.8–1.2)
LYMPHOCYTES # BLD AUTO: 1.4 K/UL (ref 1–4.8)
LYMPHOCYTES NFR BLD: 24.8 % (ref 18–48)
MCH RBC QN AUTO: 27.6 PG (ref 27–31)
MCHC RBC AUTO-ENTMCNC: 32.9 G/DL (ref 32–36)
MCV RBC AUTO: 84 FL (ref 82–98)
MONOCYTES # BLD AUTO: 0.5 K/UL (ref 0.3–1)
MONOCYTES NFR BLD: 9.2 % (ref 4–15)
NEUTROPHILS # BLD AUTO: 3.4 K/UL (ref 1.8–7.7)
NEUTROPHILS NFR BLD: 61.5 % (ref 38–73)
NRBC BLD-RTO: 0 /100 WBC
PLATELET # BLD AUTO: 265 K/UL (ref 150–450)
PMV BLD AUTO: 9.9 FL (ref 9.2–12.9)
POTASSIUM SERPL-SCNC: 3.8 MMOL/L (ref 3.5–5.1)
PROTHROMBIN TIME: 11.1 SEC (ref 9–12.5)
RBC # BLD AUTO: 5.57 M/UL (ref 4.6–6.2)
SODIUM SERPL-SCNC: 142 MMOL/L (ref 136–145)
WBC # BLD AUTO: 5.53 K/UL (ref 3.9–12.7)

## 2023-06-23 PROCEDURE — 85025 COMPLETE CBC W/AUTO DIFF WBC: CPT | Performed by: INTERNAL MEDICINE

## 2023-06-23 PROCEDURE — 85610 PROTHROMBIN TIME: CPT | Performed by: INTERNAL MEDICINE

## 2023-06-23 PROCEDURE — 80048 BASIC METABOLIC PNL TOTAL CA: CPT | Performed by: INTERNAL MEDICINE

## 2023-06-23 NOTE — DISCHARGE INSTRUCTIONS
Before 7 AM, enter through the Emergency Entrance..   After 7 AM enter through the Main Entrance.        Your procedure  is scheduled for ___6/27/2023______________.    Call [phone] between 2pm and 5pm on _2/26/2023______to find out your arrival time for the day of surgery.    You may have one visitor.  No children allowed.      You will be going to the Same Day Surgery Unit on the 2nd floor of the hospital.    Important instructions:  Do not eat anything after midnight.  You may have plain water, non carbonated.  You may also have Gatorade or Powerade after midnight.    Stop all fluids 2 hours before your surgery.    It is okay to brush your teeth.  Do not have gum, candy or mints.  Please shower the night before and the morning of your surgery.        Use Chlorhexidine soap as instructed by your pre op nurse.   Please place clean linens on your bed the night before surgery. Please wear fresh clean clothing after each shower.    No shaving of procedural area at least 4-5 days before surgery due to increased risk of skin irritation and/or possible infection.    Contact lenses and removable denture work may not be worn during your procedure.    You may wear deodorant only.     Do not wear powder, body lotion, perfume/cologne or make-up.    Do not wear any jewelry or have any metal on your body.    You will be asked to remove any dentures or partials for the procedure.    If you are going home on the same day of surgery, you must arrange for a family member or a friend to drive you home.  Public transportation is prohibited.  You will not be able to drive home if you were given anesthesia or sedation.    Please leave money and valuables home.      You may bring your cell phone.    Call the doctor if fever or illness should occur before your surgery.    Call [phone] to contact us here if needed.

## 2023-06-27 ENCOUNTER — HOSPITAL ENCOUNTER (OUTPATIENT)
Facility: HOSPITAL | Age: 43
Discharge: HOME | End: 2023-06-27
Attending: INTERNAL MEDICINE | Admitting: INTERNAL MEDICINE
Payer: COMMERCIAL

## 2023-06-27 VITALS
RESPIRATION RATE: 17 BRPM | SYSTOLIC BLOOD PRESSURE: 118 MMHG | BODY MASS INDEX: 25.55 KG/M2 | DIASTOLIC BLOOD PRESSURE: 76 MMHG | TEMPERATURE: 98 F | HEART RATE: 77 BPM | WEIGHT: 183.19 LBS | OXYGEN SATURATION: 99 %

## 2023-06-27 DIAGNOSIS — I25.119 CORONARY ARTERY DISEASE INVOLVING NATIVE CORONARY ARTERY OF NATIVE HEART WITH ANGINA PECTORIS: Primary | ICD-10-CM

## 2023-06-27 DIAGNOSIS — R93.1 AGATSTON CORONARY ARTERY CALCIUM SCORE BETWEEN 200 AND 399: ICD-10-CM

## 2023-06-27 PROCEDURE — 63600175 PHARM REV CODE 636 W HCPCS: Performed by: INTERNAL MEDICINE

## 2023-06-27 PROCEDURE — 93458 L HRT ARTERY/VENTRICLE ANGIO: CPT | Mod: 26,,, | Performed by: INTERNAL MEDICINE

## 2023-06-27 PROCEDURE — C1887 CATHETER, GUIDING: HCPCS | Performed by: INTERNAL MEDICINE

## 2023-06-27 PROCEDURE — 25500020 PHARM REV CODE 255: Performed by: INTERNAL MEDICINE

## 2023-06-27 PROCEDURE — 25000003 PHARM REV CODE 250: Performed by: INTERNAL MEDICINE

## 2023-06-27 PROCEDURE — 99152 PR MOD CONSCIOUS SEDATION, SAME PHYS, 5+ YRS, FIRST 15 MIN: ICD-10-PCS | Mod: ,,, | Performed by: INTERNAL MEDICINE

## 2023-06-27 PROCEDURE — 99152 MOD SED SAME PHYS/QHP 5/>YRS: CPT | Performed by: INTERNAL MEDICINE

## 2023-06-27 PROCEDURE — 93458 L HRT ARTERY/VENTRICLE ANGIO: CPT | Performed by: INTERNAL MEDICINE

## 2023-06-27 PROCEDURE — 99152 MOD SED SAME PHYS/QHP 5/>YRS: CPT | Mod: ,,, | Performed by: INTERNAL MEDICINE

## 2023-06-27 PROCEDURE — C1894 INTRO/SHEATH, NON-LASER: HCPCS | Performed by: INTERNAL MEDICINE

## 2023-06-27 PROCEDURE — C1769 GUIDE WIRE: HCPCS | Performed by: INTERNAL MEDICINE

## 2023-06-27 PROCEDURE — 93458 PR CATH PLACE/CORON ANGIO, IMG SUPER/INTERP,W LEFT HEART VENTRICULOGRAPHY: ICD-10-PCS | Mod: 26,,, | Performed by: INTERNAL MEDICINE

## 2023-06-27 RX ORDER — DIPHENHYDRAMINE HCL 25 MG
50 CAPSULE ORAL ONCE
Status: COMPLETED | OUTPATIENT
Start: 2023-06-27 | End: 2023-06-27

## 2023-06-27 RX ORDER — MORPHINE SULFATE 4 MG/ML
2 INJECTION, SOLUTION INTRAMUSCULAR; INTRAVENOUS EVERY 10 MIN PRN
Status: DISCONTINUED | OUTPATIENT
Start: 2023-06-27 | End: 2023-06-27 | Stop reason: HOSPADM

## 2023-06-27 RX ORDER — ACETAMINOPHEN 325 MG/1
650 TABLET ORAL EVERY 4 HOURS PRN
Status: DISCONTINUED | OUTPATIENT
Start: 2023-06-27 | End: 2023-06-27 | Stop reason: HOSPADM

## 2023-06-27 RX ORDER — VERAPAMIL HYDROCHLORIDE 2.5 MG/ML
INJECTION, SOLUTION INTRAVENOUS
Status: DISCONTINUED | OUTPATIENT
Start: 2023-06-27 | End: 2023-06-27 | Stop reason: HOSPADM

## 2023-06-27 RX ORDER — HYDROCODONE BITARTRATE AND ACETAMINOPHEN 5; 325 MG/1; MG/1
1 TABLET ORAL EVERY 4 HOURS PRN
Status: DISCONTINUED | OUTPATIENT
Start: 2023-06-27 | End: 2023-06-27 | Stop reason: HOSPADM

## 2023-06-27 RX ORDER — NITROGLYCERIN 20 MG/100ML
INJECTION INTRAVENOUS
Status: DISCONTINUED | OUTPATIENT
Start: 2023-06-27 | End: 2023-06-27 | Stop reason: HOSPADM

## 2023-06-27 RX ORDER — SODIUM CHLORIDE 9 MG/ML
INJECTION, SOLUTION INTRAVENOUS CONTINUOUS
Status: ACTIVE | OUTPATIENT
Start: 2023-06-27 | End: 2023-06-27

## 2023-06-27 RX ORDER — LIDOCAINE HYDROCHLORIDE 10 MG/ML
INJECTION, SOLUTION EPIDURAL; INFILTRATION; INTRACAUDAL; PERINEURAL
Status: DISCONTINUED | OUTPATIENT
Start: 2023-06-27 | End: 2023-06-27 | Stop reason: HOSPADM

## 2023-06-27 RX ORDER — ATROPINE SULFATE 0.1 MG/ML
0.5 INJECTION INTRAVENOUS
Status: DISCONTINUED | OUTPATIENT
Start: 2023-06-27 | End: 2023-06-27 | Stop reason: HOSPADM

## 2023-06-27 RX ORDER — FENTANYL CITRATE 50 UG/ML
INJECTION, SOLUTION INTRAMUSCULAR; INTRAVENOUS
Status: DISCONTINUED | OUTPATIENT
Start: 2023-06-27 | End: 2023-06-27 | Stop reason: HOSPADM

## 2023-06-27 RX ORDER — HEPARIN SODIUM 1000 [USP'U]/ML
INJECTION, SOLUTION INTRAVENOUS; SUBCUTANEOUS
Status: DISCONTINUED | OUTPATIENT
Start: 2023-06-27 | End: 2023-06-27 | Stop reason: HOSPADM

## 2023-06-27 RX ORDER — ONDANSETRON 8 MG/1
8 TABLET, ORALLY DISINTEGRATING ORAL EVERY 8 HOURS PRN
Status: DISCONTINUED | OUTPATIENT
Start: 2023-06-27 | End: 2023-06-27 | Stop reason: HOSPADM

## 2023-06-27 RX ORDER — MIDAZOLAM HYDROCHLORIDE 1 MG/ML
INJECTION, SOLUTION INTRAMUSCULAR; INTRAVENOUS
Status: DISCONTINUED | OUTPATIENT
Start: 2023-06-27 | End: 2023-06-27 | Stop reason: HOSPADM

## 2023-06-27 RX ADMIN — SODIUM CHLORIDE 1600 ML: 9 INJECTION, SOLUTION INTRAVENOUS at 11:06

## 2023-06-27 RX ADMIN — SODIUM CHLORIDE: 9 INJECTION, SOLUTION INTRAVENOUS at 10:06

## 2023-06-27 RX ADMIN — DIPHENHYDRAMINE HYDROCHLORIDE 50 MG: 25 CAPSULE ORAL at 10:06

## 2023-06-27 NOTE — PLAN OF CARE
Pt verbalized a readiness to go home. VSS. Written and verbal discharge instructions given. Pt verbalized an understanding to stop Plavix as instructed as Dr. Goff. Pt aware of follow-up appt.

## 2023-06-27 NOTE — DISCHARGE INSTRUCTIONS
Drink plenty of fluids for the next 48 hours and follow your doctor's diet orders.  Rest for the next 72 hours. Try not to keep the injected leg bent for a long period of time.  Remove the dressing in 24 hours, and you may shower. Clean the area with soap and water, and apply a band aid for the  next 5 days.        No Lifting over 5-10 lbs., that is, not more than 1 gallon of water, or straining for 72 hours.    No driving, no drinking alcohol, and no signing legal documents for the next 24 hours.    Call your doctor for elevated temperature, shortness of breath, chest pain, or cold discolored leg.   If oozing occurs at the injections site, lie down. Apply pressure with a clean wash cloth for 20 to 30 minutes and call  your doctor.  If severe bleeding occurs, lie down, apply pressure. Call 911 and request an ambulance to take you to the nearest  hospital emergency room.      Limit movement of the wrist.  Do not bend wrist or perform heavy lifting for 24 hours.      NO blood pressure cuff or venipuncture to affected arm for 24 hours.    If bleeding occurs, apply pressure to site for 20 minutes. Apply band aid once bleeding stops.  Call 911 if unable to stop bleeding with pressure.     Fall Prevention  Millions of people fall every year and injure themselves. You may have had anesthesia or sedation which may increase your risk of falling. You may have health issues that put you at an increased risk of falling.     Here are ways to reduce your risk of falling.    Make your home safe by keeping walkways clear of objects you may trip over.  Use non-slip pads under rugs. Do not use area rugs or small throw rugs.  Use non-slip mats in bathtubs and showers.  Install handrails and lights on staircases.  Do not walk in poorly lit areas.  Do not stand on chairs or wobbly ladders.  Use caution when reaching overhead or looking upward. This position can cause a loss of balance.  Be sure your shoes fit properly, have non-slip  bottoms and are in good condition.   Wear shoes both inside and out. Avoid going barefoot or wearing slippers.  Be cautious when going up and down stairs, curbs, and when walking on uneven sidewalks.  If your balance is poor, consider using a cane or walker.  If your fall was related to alcohol use, stop or limit alcohol intake.   If your fall was related to use of sleeping medicines, talk to your doctor about this. You may need to reduce your dosage at bedtime if you awaken during the night to go to the bathroom.    To reduce the need for nighttime bathroom trips:  Avoid drinking fluids for several hours before going to bed  Empty your bladder before going to bed  Men can keep a urinal at the bedside  Stay as active as you can. Balance, flexibility, strength, and endurance all come from exercise. They all play a role in preventing falls. Ask your healthcare provider which types of activity are right for you.  Get your vision checked on a regular basis.  If you have pets, know where they are before you stand up or walk so you don't trip over them.  Use night lights.

## 2023-06-27 NOTE — Clinical Note
The radial band was applied to the right radial artery. 24 cc's of air were inserted into the closure device.

## 2023-06-27 NOTE — BRIEF OP NOTE
[facility] - [place] - Cath Lab  Brief Operative Note     SUMMARY     Surgery Date: 6/27/2023     Surgeon(s) and Role:     * Vincent Goff MD - Primary    Assisting Surgeon: None    Pre-op Diagnosis:  Coronary artery disease involving native coronary artery of native heart with angina pectoris [I25.119]  Agatston coronary artery calcium score between 200 and 399 [R93.1]    Post-op Diagnosis:  Post-Op Diagnosis Codes:     * Coronary artery disease involving native coronary artery of native heart with angina pectoris [I25.119]     * Agatston coronary artery calcium score between 200 and 399 [R93.1]    Procedure(s) (LRB):  Left heart cath (Left)    Anesthesia: RN IV Sedation    Description of the findings of the procedure: uneventful LHC/cor angio via R radial.  Minimal CAD noted.    Findings/Key Components:  LVEDP: 5mmHg  LVEF: 55% by echo    Dominance: Right  LM: normal  LAD: prox 20%  LCx: normal  RCA: prox 20%    Hemostasis:  R Radial band    Impression:  High Ca++ score (concentrated in prox LAD) with atyp sxs.  Minimal CAD, normal LV fxn by echo  R rad vasband for hemostasis    Plan:  Cont med rx  Cont ASA 81mg qd  Stop Plavix  Cont statin  Home today  Follow up with Dr. Goff as planned.    Estimated Blood Loss: <50cc         Specimens: None

## 2023-06-27 NOTE — DISCHARGE SUMMARY
[facility] - [place] - Cath Lab  Discharge Note    SUMMARY     Admit Date: 6/27/2023    Discharge Date and Time:  06/27/2023 1:40 PM    Hospital Course (synopsis of major diagnoses, care, treatment, and services provided during the course of the hospital stay): uneventful LHC/cor angio via R radial.  Minimal CAD noted.     Final Diagnosis: Post-Op Diagnosis Codes:     * Coronary artery disease involving native coronary artery of native heart with angina pectoris [I25.119]     * Agatston coronary artery calcium score between 200 and 399 [R93.1]    Disposition: Home or Self Care    Follow Up/Patient Instructions:     Medications:  Reconciled Home Medications:      Medication List        CONTINUE taking these medications      aspirin 81 MG EC tablet  Commonly known as: ECOTRIN  Take 1 tablet (81 mg total) by mouth once daily.     atorvastatin 80 MG tablet  Commonly known as: LIPITOR  Take 1 tablet (80 mg total) by mouth once daily.            STOP taking these medications      clopidogreL 75 mg tablet  Commonly known as: PLAVIX            Discharge Procedure Orders   Diet Cardiac     Remove dressing in 24 hours     Call MD for:  temperature >100.4     Call MD for:  persistent nausea and vomiting     Call MD for:  severe uncontrolled pain     Call MD for:  difficulty breathing, headache or visual disturbances     Call MD for:  redness, tenderness, or signs of infection (pain, swelling, redness, odor or green/yellow discharge around incision site)     Call MD for:  hives     Call MD for:  persistent dizziness or light-headedness     Call MD for:  extreme fatigue     Activity as tolerated      Follow-up Information       Vincent Goff MD Follow up on 7/31/2023.    Specialties: Cardiology, Interventional Cardiology  Why: 820am, for planned follow up appointment  Contact information:  [address]  [address] [place], [place] [id number] [phone]                               Diet: cardiac    Activity: ad gauri.

## 2023-07-28 DIAGNOSIS — R93.1 AGATSTON CORONARY ARTERY CALCIUM SCORE BETWEEN 200 AND 399: Primary | ICD-10-CM

## 2023-07-28 NOTE — PROGRESS NOTES
Rebeccae seen an examined the patient. His right radial access is healing well  without significant ecchymosis.     I will plan to see the patient back in the office in one year with repeat, lipid panel and LFTs.

## 2023-08-11 ENCOUNTER — PATIENT MESSAGE (OUTPATIENT)
Dept: RESEARCH | Facility: HOSPITAL | Age: 43
End: 2023-08-11
Payer: COMMERCIAL

## 2024-07-01 ENCOUNTER — PATIENT OUTREACH (OUTPATIENT)
Dept: ADMINISTRATIVE | Facility: HOSPITAL | Age: 44
End: 2024-07-01
Payer: COMMERCIAL

## 2024-07-30 ENCOUNTER — PATIENT OUTREACH (OUTPATIENT)
Dept: ADMINISTRATIVE | Facility: HOSPITAL | Age: 44
End: 2024-07-30
Payer: COMMERCIAL

## 2024-08-06 ENCOUNTER — OFFICE VISIT (OUTPATIENT)
Dept: URGENT CARE | Facility: CLINIC | Age: 44
End: 2024-08-06
Payer: COMMERCIAL

## 2024-08-06 VITALS
DIASTOLIC BLOOD PRESSURE: 85 MMHG | WEIGHT: 183 LBS | TEMPERATURE: 97 F | SYSTOLIC BLOOD PRESSURE: 121 MMHG | OXYGEN SATURATION: 96 % | HEART RATE: 82 BPM | RESPIRATION RATE: 18 BRPM | BODY MASS INDEX: 25.52 KG/M2

## 2024-08-06 DIAGNOSIS — H00.021 HORDEOLUM INTERNUM OF RIGHT UPPER EYELID: ICD-10-CM

## 2024-08-06 DIAGNOSIS — H61.23 BILATERAL IMPACTED CERUMEN: Primary | ICD-10-CM

## 2024-08-06 PROCEDURE — 69209 REMOVE IMPACTED EAR WAX UNI: CPT | Mod: 50,,, | Performed by: NURSE PRACTITIONER

## 2024-08-06 PROCEDURE — 99213 OFFICE O/P EST LOW 20 MIN: CPT | Mod: 25,,, | Performed by: NURSE PRACTITIONER

## 2024-08-06 RX ORDER — ERYTHROMYCIN 5 MG/G
OINTMENT OPHTHALMIC 3 TIMES DAILY
Qty: 3.5 G | Refills: 0 | Status: SHIPPED | OUTPATIENT
Start: 2024-08-06 | End: 2024-08-16

## 2024-08-13 ENCOUNTER — LAB VISIT (OUTPATIENT)
Dept: LAB | Facility: OTHER | Age: 44
End: 2024-08-13
Attending: INTERNAL MEDICINE
Payer: COMMERCIAL

## 2024-08-13 ENCOUNTER — OFFICE VISIT (OUTPATIENT)
Dept: INTERNAL MEDICINE | Facility: CLINIC | Age: 44
End: 2024-08-13
Attending: INTERNAL MEDICINE
Payer: COMMERCIAL

## 2024-08-13 VITALS
HEART RATE: 72 BPM | SYSTOLIC BLOOD PRESSURE: 122 MMHG | DIASTOLIC BLOOD PRESSURE: 80 MMHG | BODY MASS INDEX: 25.52 KG/M2 | HEIGHT: 71 IN | OXYGEN SATURATION: 99 %

## 2024-08-13 DIAGNOSIS — R93.1 AGATSTON CORONARY ARTERY CALCIUM SCORE BETWEEN 200 AND 399: ICD-10-CM

## 2024-08-13 DIAGNOSIS — I25.119 CORONARY ARTERY DISEASE INVOLVING NATIVE CORONARY ARTERY OF NATIVE HEART WITH ANGINA PECTORIS: ICD-10-CM

## 2024-08-13 DIAGNOSIS — Z00.00 ANNUAL PHYSICAL EXAM: ICD-10-CM

## 2024-08-13 DIAGNOSIS — Z00.00 ANNUAL PHYSICAL EXAM: Primary | ICD-10-CM

## 2024-08-13 LAB
ALBUMIN SERPL BCP-MCNC: 4.4 G/DL (ref 3.5–5.2)
ALBUMIN SERPL BCP-MCNC: 4.4 G/DL (ref 3.5–5.2)
ALP SERPL-CCNC: 32 U/L (ref 55–135)
ALP SERPL-CCNC: 32 U/L (ref 55–135)
ALT SERPL W/O P-5'-P-CCNC: 27 U/L (ref 10–44)
ALT SERPL W/O P-5'-P-CCNC: 27 U/L (ref 10–44)
ANION GAP SERPL CALC-SCNC: 9 MMOL/L (ref 8–16)
AST SERPL-CCNC: 20 U/L (ref 10–40)
AST SERPL-CCNC: 20 U/L (ref 10–40)
BASOPHILS # BLD AUTO: 0.08 K/UL (ref 0–0.2)
BASOPHILS NFR BLD: 1.3 % (ref 0–1.9)
BILIRUB DIRECT SERPL-MCNC: 0.4 MG/DL (ref 0.1–0.3)
BILIRUB SERPL-MCNC: 1.3 MG/DL (ref 0.1–1)
BILIRUB SERPL-MCNC: 1.3 MG/DL (ref 0.1–1)
BUN SERPL-MCNC: 11 MG/DL (ref 6–20)
CALCIUM SERPL-MCNC: 9.8 MG/DL (ref 8.7–10.5)
CHLORIDE SERPL-SCNC: 105 MMOL/L (ref 95–110)
CHOLEST SERPL-MCNC: 155 MG/DL (ref 120–199)
CHOLEST/HDLC SERPL: 3 {RATIO} (ref 2–5)
CO2 SERPL-SCNC: 25 MMOL/L (ref 23–29)
CREAT SERPL-MCNC: 1 MG/DL (ref 0.5–1.4)
DIFFERENTIAL METHOD BLD: ABNORMAL
EOSINOPHIL # BLD AUTO: 0.8 K/UL (ref 0–0.5)
EOSINOPHIL NFR BLD: 13 % (ref 0–8)
ERYTHROCYTE [DISTWIDTH] IN BLOOD BY AUTOMATED COUNT: 12.7 % (ref 11.5–14.5)
EST. GFR  (NO RACE VARIABLE): >60 ML/MIN/1.73 M^2
ESTIMATED AVG GLUCOSE: 100 MG/DL (ref 68–131)
GLUCOSE SERPL-MCNC: 90 MG/DL (ref 70–110)
HBA1C MFR BLD: 5.1 % (ref 4–5.6)
HCT VFR BLD AUTO: 47.4 % (ref 40–54)
HDLC SERPL-MCNC: 52 MG/DL (ref 40–75)
HDLC SERPL: 33.5 % (ref 20–50)
HGB BLD-MCNC: 15.3 G/DL (ref 14–18)
IMM GRANULOCYTES # BLD AUTO: 0.01 K/UL (ref 0–0.04)
IMM GRANULOCYTES NFR BLD AUTO: 0.2 % (ref 0–0.5)
LDLC SERPL CALC-MCNC: 72.4 MG/DL (ref 63–159)
LYMPHOCYTES # BLD AUTO: 1.6 K/UL (ref 1–4.8)
LYMPHOCYTES NFR BLD: 25.7 % (ref 18–48)
MCH RBC QN AUTO: 28 PG (ref 27–31)
MCHC RBC AUTO-ENTMCNC: 32.3 G/DL (ref 32–36)
MCV RBC AUTO: 87 FL (ref 82–98)
MONOCYTES # BLD AUTO: 0.4 K/UL (ref 0.3–1)
MONOCYTES NFR BLD: 7.1 % (ref 4–15)
NEUTROPHILS # BLD AUTO: 3.2 K/UL (ref 1.8–7.7)
NEUTROPHILS NFR BLD: 52.7 % (ref 38–73)
NONHDLC SERPL-MCNC: 103 MG/DL
NRBC BLD-RTO: 0 /100 WBC
PLATELET # BLD AUTO: 244 K/UL (ref 150–450)
PMV BLD AUTO: 9.6 FL (ref 9.2–12.9)
POTASSIUM SERPL-SCNC: 4.1 MMOL/L (ref 3.5–5.1)
PROT SERPL-MCNC: 7.1 G/DL (ref 6–8.4)
PROT SERPL-MCNC: 7.1 G/DL (ref 6–8.4)
RBC # BLD AUTO: 5.46 M/UL (ref 4.6–6.2)
SODIUM SERPL-SCNC: 139 MMOL/L (ref 136–145)
TRIGL SERPL-MCNC: 153 MG/DL (ref 30–150)
TSH SERPL DL<=0.005 MIU/L-ACNC: 1.41 UIU/ML (ref 0.4–4)
WBC # BLD AUTO: 6.08 K/UL (ref 3.9–12.7)

## 2024-08-13 PROCEDURE — 80061 LIPID PANEL: CPT | Performed by: INTERNAL MEDICINE

## 2024-08-13 PROCEDURE — 90651 9VHPV VACCINE 2/3 DOSE IM: CPT | Mod: S$GLB,,, | Performed by: INTERNAL MEDICINE

## 2024-08-13 PROCEDURE — 80076 HEPATIC FUNCTION PANEL: CPT | Performed by: INTERNAL MEDICINE

## 2024-08-13 PROCEDURE — 1159F MED LIST DOCD IN RCRD: CPT | Mod: CPTII,S$GLB,, | Performed by: INTERNAL MEDICINE

## 2024-08-13 PROCEDURE — 36415 COLL VENOUS BLD VENIPUNCTURE: CPT | Performed by: INTERNAL MEDICINE

## 2024-08-13 PROCEDURE — 1160F RVW MEDS BY RX/DR IN RCRD: CPT | Mod: CPTII,S$GLB,, | Performed by: INTERNAL MEDICINE

## 2024-08-13 PROCEDURE — 3008F BODY MASS INDEX DOCD: CPT | Mod: CPTII,S$GLB,, | Performed by: INTERNAL MEDICINE

## 2024-08-13 PROCEDURE — 3074F SYST BP LT 130 MM HG: CPT | Mod: CPTII,S$GLB,, | Performed by: INTERNAL MEDICINE

## 2024-08-13 PROCEDURE — 3079F DIAST BP 80-89 MM HG: CPT | Mod: CPTII,S$GLB,, | Performed by: INTERNAL MEDICINE

## 2024-08-13 PROCEDURE — 83036 HEMOGLOBIN GLYCOSYLATED A1C: CPT | Performed by: INTERNAL MEDICINE

## 2024-08-13 PROCEDURE — 85025 COMPLETE CBC W/AUTO DIFF WBC: CPT | Performed by: INTERNAL MEDICINE

## 2024-08-13 PROCEDURE — 99999 PR PBB SHADOW E&M-EST. PATIENT-LVL III: CPT | Mod: PBBFAC,,, | Performed by: INTERNAL MEDICINE

## 2024-08-13 PROCEDURE — 84443 ASSAY THYROID STIM HORMONE: CPT | Performed by: INTERNAL MEDICINE

## 2024-08-13 PROCEDURE — 80053 COMPREHEN METABOLIC PANEL: CPT | Performed by: INTERNAL MEDICINE

## 2024-08-13 PROCEDURE — 90471 IMMUNIZATION ADMIN: CPT | Mod: S$GLB,,, | Performed by: INTERNAL MEDICINE

## 2024-08-13 PROCEDURE — 99396 PREV VISIT EST AGE 40-64: CPT | Mod: 25,S$GLB,, | Performed by: INTERNAL MEDICINE

## 2024-08-13 NOTE — PROGRESS NOTES
"Subjective:       Patient ID: Alexander Colmenares is a 43 y.o. male.    Chief Complaint: Annual Exam (//)    Here for annual exam    Exercising more. Tolerating statin. Weight is down. Recently . We will get him HPV vaccine.     ### CAD ###  Coronary calcium score of 330  (concentrated in prox LAD) 5/2023 lead to LHC by Dr Goff on 6/27/2023. Dominance: Right; LM: normal; LAD: prox 20%; LCx: normal; RCA: prox 20%  -lesion on calcium score determined to be outside vessel.        Family hx:  MGF CVA and MI  Father with hx of bicuspid valve requiring surgery          Review of Systems   Constitutional:  Negative for appetite change, chills, fever and unexpected weight change.   HENT:  Negative for hearing loss, sore throat and trouble swallowing.    Eyes:  Negative for visual disturbance.   Respiratory:  Negative for cough, chest tightness and shortness of breath.    Cardiovascular:  Negative for chest pain and leg swelling.   Gastrointestinal:  Negative for abdominal pain, blood in stool, constipation, diarrhea, nausea and vomiting.   Endocrine: Negative for polydipsia and polyuria.   Genitourinary:  Negative for decreased urine volume, difficulty urinating, dysuria, frequency and urgency.   Musculoskeletal:  Negative for gait problem.   Skin:  Negative for rash.   Neurological:  Negative for dizziness and numbness.   Psychiatric/Behavioral:  The patient is not nervous/anxious.        Objective:      Vitals:    08/13/24 0809   BP: 122/80   BP Location: Left arm   Patient Position: Sitting   Pulse: 72   SpO2: 99%   Height: 5' 11" (1.803 m)      Physical Exam  Vitals and nursing note reviewed.   Constitutional:       General: He is not in acute distress.     Appearance: Normal appearance. He is well-developed.   HENT:      Head: Normocephalic and atraumatic.      Mouth/Throat:      Pharynx: No oropharyngeal exudate.   Eyes:      General: No scleral icterus.     Conjunctiva/sclera: Conjunctivae normal.      " Pupils: Pupils are equal, round, and reactive to light.   Neck:      Thyroid: No thyromegaly.   Cardiovascular:      Rate and Rhythm: Normal rate and regular rhythm.      Heart sounds: Normal heart sounds. No murmur heard.  Pulmonary:      Effort: Pulmonary effort is normal.      Breath sounds: Normal breath sounds. No wheezing or rales.   Abdominal:      General: There is no distension.   Musculoskeletal:         General: No tenderness.   Lymphadenopathy:      Cervical: No cervical adenopathy.   Skin:     General: Skin is warm and dry.   Neurological:      Mental Status: He is alert and oriented to person, place, and time.   Psychiatric:         Behavior: Behavior normal.         Assessment:       1. Annual physical exam    2. Coronary artery disease involving native coronary artery of native heart with angina pectoris        Plan:       Alexander was seen today for annual exam.    Diagnoses and all orders for this visit:    Annual physical exam  -     Comprehensive Metabolic Panel; Future  -     Lipid Panel; Future  -     TSH; Future  -     CBC Auto Differential; Future  -     Hemoglobin A1C; Future    Coronary artery disease involving native coronary artery of native heart with angina pectoris   Tolerating statin. Continue this.     Other orders  -     hpv vaccine,9-bridget (GARDASIL 9) vaccine 0.5 mL           Henrique Mancera MD  Internal Medicine-[facility]        Side effects of medication(s) were discussed in detail and patient voiced understanding.  Patient will call back for any issues or complications.

## 2024-08-13 NOTE — PROGRESS NOTES
After obtaining verbal consent from the patient, and per orders of Dr. Eden, injection of gardasil 9 Lot [id number] 4/10/26 given in the LD by KEY GAGNON. Patient tolerated well and band aid applied. Patient instructed to remain in clinic for 15 minutes afterwards, and to report any adverse reaction to me immediately.

## 2024-09-16 NOTE — TELEPHONE ENCOUNTER
No care due was identified.  [facility] Embedded Care Due Messages. Reference number: [id number].   9/16/2024 1:33:11 PM CDT

## 2024-09-17 ENCOUNTER — TELEPHONE (OUTPATIENT)
Dept: OPHTHALMOLOGY | Facility: CLINIC | Age: 44
End: 2024-09-17
Payer: COMMERCIAL

## 2024-09-17 RX ORDER — ATORVASTATIN CALCIUM 80 MG/1
80 TABLET, FILM COATED ORAL DAILY
Qty: 90 TABLET | Refills: 3 | Status: SHIPPED | OUTPATIENT
Start: 2024-09-17 | End: 2025-09-17

## 2024-09-17 NOTE — TELEPHONE ENCOUNTER
Refill Decision Note   Alexander Dedra  is requesting a refill authorization.  Brief Assessment and Rationale for Refill:  Approve     Medication Therapy Plan:         Comments:     Note composed:1:43 PM 09/17/2024

## 2024-09-17 NOTE — TELEPHONE ENCOUNTER
Spoke to pt about getting his Hordeolum removed. I gave him the choice of locations ([facility], [facility]) and several time frames, but unfortunately those did not work with his work schedule. Pt stated he has an appt with Dr Joaquin tomorrow 9-18-24 and will email me if that doctor does not do it, so we can get him scheduled and on the books.

## 2024-09-18 ENCOUNTER — OFFICE VISIT (OUTPATIENT)
Dept: OTOLARYNGOLOGY | Facility: CLINIC | Age: 44
End: 2024-09-18
Payer: COMMERCIAL

## 2024-09-18 VITALS
HEART RATE: 86 BPM | WEIGHT: 185.63 LBS | BODY MASS INDEX: 25.89 KG/M2 | SYSTOLIC BLOOD PRESSURE: 125 MMHG | DIASTOLIC BLOOD PRESSURE: 85 MMHG

## 2024-09-18 DIAGNOSIS — H00.019 HORDEOLUM EXTERNUM, UNSPECIFIED LATERALITY: ICD-10-CM

## 2024-09-18 PROCEDURE — 99999 PR PBB SHADOW E&M-EST. PATIENT-LVL IV: CPT | Mod: PBBFAC,,, | Performed by: STUDENT IN AN ORGANIZED HEALTH CARE EDUCATION/TRAINING PROGRAM

## 2024-09-18 NOTE — PATIENT INSTRUCTIONS
Chalazion Instructions    Overview:  Chalazions are common.  They are harmless but take a long time to go away.  Here is some care advice that should help.    Warm Compresses and Eyelid Massage:  The purpose of eyelid massage is to help open the blocked oil gland.  Put a warm wet cloth on the eyelid to soften up the lump. Leave it on for 5 minutes. Always do this first.  Then, using your clean finger or a cotton swab, gently massage the lump. Massage downward towards the edge of the eyelid.  Do this for about 1 minute. Repeat this process twice a day.  Continue this treatment for 1 month.    Eyelid Washes:  Do this only if there is crusting of the eyelid margins.  Put 2 drops of baby shampoo into a glass of warm water.  Wet some cotton balls in the solution. Use them to scrub the lid margins with the eyes closed. Do this for 2 minutes once a day.  Your goal is to remove the crusts.

## 2024-09-18 NOTE — PROGRESS NOTES
OTOLARYNGOLOGY- FACIAL PLASTIC & RECONSTRUCTIVE SURGERY      Alexander Colmenares  [id number]    CC:  Chief Complaint   Patient presents with    Hordeolum externum       HISTORY OF PRESENT ILLNESS: Alexander Colmenares  is a 43 y.o. male who was referred to me by Dr. Henrique Eden in consultation for right upper eyelid stye.  Alexander reports a 1 month history of right upper eyelid swelling.  He states it came out of nowhere.  He has been using warm compresses pretty consistently and has noted it has not improved.  He states it sometimes will drain a little bit of weepy fluid but nothing significant.  He endorses some pain, tenderness and heaviness to the outer aspect of his right upper eyelid.      The patient denies any vision change.  He denies having prior styes or chalazia before.  He denies history of rosacea.      Occupation/Family:  Appellate  at [employer]  Went to [employer] school has 2 kids, 12 and 15-year-old boys      Past Medical History  He has a past medical history of Hyperlipidemia, Hypertension, Seasonal allergies, and Sleep apnea.    Past Surgical History  He has a past surgical history that includes [place] tooth extraction and Left heart catheterization (Left, 06/27/2023).    Family History  His family history includes Bladder Cancer in his father; Cancer in his father; Heart disease in his maternal grandfather, maternal grandmother, paternal grandfather, and paternal grandmother; Hyperlipidemia in his father, maternal grandfather, maternal grandmother, mother, paternal grandfather, and paternal grandmother; Hypertension in his mother and sister; Valvular heart disease in his father.    Social History  He reports that he has never smoked. He has never used smokeless tobacco. He reports current alcohol use of about 7.0 standard drinks of alcohol per week. He reports that he does not use drugs.    Allergies  He has No Known Allergies.    Medications  He has a current medication list  which includes the following prescription(s): aspirin and atorvastatin, and the following Facility-Administered Medications: sodium chloride 0.9%.      Review of Systems     Constitutional: Negative for appetite change, chills, fatigue, fever and unexpected weight loss.      HENT: Negative for ear discharge, ear infection, ear pain, facial swelling, hearing loss, mouth sores, nosebleeds, postnasal drip, ringing in the ears, runny nose, sinus infection, sinus pressure, sore throat, stuffy nose, tonsil infection, dental problems, trouble swallowing and voice change.      Eyes:  Positive for eye drainage.     Respiratory:  Negative for cough, shortness of breath, sleep apnea, snoring and wheezing.      Cardiovascular:  Negative for chest pain, foot swelling, irregular heartbeat and swollen veins.     Gastrointestinal:  Negative for abdominal pain, acid reflux, constipation, diarrhea, heartburn and vomiting.     Genitourinary: Negative for difficulty urinating, sexual problems and frequent urination.     Musc: Negative for aching joints, aching muscles, back pain and neck pain.     Skin: Negative for rash.     Allergy: Negative for food allergies and seasonal allergies.     Endocrine: Negative for cold intolerance and heat intolerance.      Neurological: Negative for dizziness, headaches, light-headedness, seizures and tremors.      Hematologic: Negative for bruises/bleeds easily and swollen glands.              PHYSICAL EXAM:  /85 (BP Location: Right arm, Patient Position: Sitting, BP Method: Large (Automatic))   Pulse 86   Wt 84.2 kg (185 lb 10 oz)   BMI 25.89 kg/m²     General: Alert and oriented in no acute distress  Head and Face: Normocephaic, atruamatic  Nose: Anterior rhinoscopy reveals overall normal appearing turbinates, septum midline, no obvious lesions or masses  Neurological: Cranial nerves are grossly intact  Skin: Skin texture/appearance is appropriate for age  Eyes:   Lateral aspect of the  right upper eyelid with focal tenderness and area that has been auto draining just above the eyelash  Ears: Pinna are normal in shape and position  EACs healthy appearing bilaterally  TMs clear without FADY or perforation bilaterally  Oral cavity and Oropharynx: Mucous membranes moist without lesions present.  Tongue protrudes midline and palate elevates midline.  Neck: Supple without LAD.    Lungs:breathing comfortably  Psychiatric:  Mood and affect are normal              Imaging:  na    ASSESSMENT:   1. Hordeolum externum, unspecified laterality      Right upper eyelid external stye, unresolved x 1 month of conservative management        PLAN:     I discussed with the patient the pathophysiology of his stye.  He has an external stye that has not responded to conservative treatment with warm compresses and erythromycin ophthalmic ointment.  I recommend that he discontinue the erythromycin ophthalmic ointment and start twice daily warm compresses and cleansing with baby shampoo.  There is a good chance this will likely improve his symptoms.      I discussed thoroughly with the patient his treatment options including continued watchful waiting and incision and drainage. We discussed the risks and benefits of both approaches.  As the patient is very bothered by his symptoms we tentatively scheduled drainage next Friday in clinic, should he not respond to the above treatments. All questions answered.      I had an elaborate discussion with the patient regarding his condition and the further workup and management options. They are in understanding of the above stated plan.    I have spent approximately 30 minutes on this patient encounter. This includes face to face time and non-face to face time preparing to see the patient (eg, review of tests), obtaining and/or reviewing separately obtained history, documenting clinical information in the electronic or other health record, independently interpreting results and  communicating results to the patient/family/caregiver, or care coordinator.      Kit Joaquin MD  Facial Plastic and Reconstructive Surgeon  [facility] - [facility] & Ne[facility]

## 2024-09-20 ENCOUNTER — TELEPHONE (OUTPATIENT)
Dept: OPHTHALMOLOGY | Facility: CLINIC | Age: 44
End: 2024-09-20
Payer: COMMERCIAL

## 2024-09-23 ENCOUNTER — TELEPHONE (OUTPATIENT)
Dept: OTOLARYNGOLOGY | Facility: CLINIC | Age: 44
End: 2024-09-23
Payer: COMMERCIAL

## 2024-09-24 ENCOUNTER — PATIENT MESSAGE (OUTPATIENT)
Dept: OTOLARYNGOLOGY | Facility: CLINIC | Age: 44
End: 2024-09-24
Payer: COMMERCIAL

## 2024-10-01 ENCOUNTER — TELEPHONE (OUTPATIENT)
Dept: INTERNAL MEDICINE | Facility: CLINIC | Age: 44
End: 2024-10-01

## 2024-10-01 ENCOUNTER — CLINICAL SUPPORT (OUTPATIENT)
Dept: INTERNAL MEDICINE | Facility: CLINIC | Age: 44
End: 2024-10-01
Payer: COMMERCIAL

## 2024-10-01 VITALS
DIASTOLIC BLOOD PRESSURE: 85 MMHG | WEIGHT: 185.63 LBS | HEART RATE: 86 BPM | SYSTOLIC BLOOD PRESSURE: 125 MMHG | BODY MASS INDEX: 25.99 KG/M2 | HEIGHT: 71 IN

## 2024-10-01 DIAGNOSIS — Z23 NEED FOR VACCINATION: Primary | ICD-10-CM

## 2024-10-01 PROCEDURE — 99999 PR PBB SHADOW E&M-EST. PATIENT-LVL II: CPT | Mod: PBBFAC,,,

## 2024-10-01 PROCEDURE — 90651 9VHPV VACCINE 2/3 DOSE IM: CPT | Mod: S$GLB,,, | Performed by: INTERNAL MEDICINE

## 2024-10-01 PROCEDURE — 90471 IMMUNIZATION ADMIN: CPT | Mod: S$GLB,,, | Performed by: INTERNAL MEDICINE

## 2024-10-01 NOTE — PROGRESS NOTES
TWO PATIENT IDENTIFIERS VERIFIED.  ALLERGIES VERIFIED.     After obtaining verbal consent from the patient, and per orders of Dr. ABAD, injection of hpv vaccine,9-bridget (GARDASIL 9) vaccine 0.5 mL Lot [id number] given IM in the LEFT DELTOID by RHODA POWERS LPN. Patient tolerated well and band aid applied. Patient instructed to remain in clinic for 15 minutes afterwards, and to report any adverse reaction to me immediately.

## 2024-10-01 NOTE — TELEPHONE ENCOUNTER
"IN-PERSON NV FOR 2ND DOSE HPV VACCINE:  "  My Note 8:55 AM     Edit     Delete     Copy     TWO PATIENT IDENTIFIERS VERIFIED.  ALLERGIES VERIFIED.      After obtaining verbal consent from the patient, and per orders of Dr. ABAD, injection of hpv vaccine,9-bridget (GARDASIL 9) vaccine 0.5 mL Lot [id number] given IM in the LEFT DELTOID by RHODA POWERS LPN. Patient tolerated well and band aid applied. Patient instructed to remain in clinic for 15 minutes afterwards, and to report any adverse reaction to me immediately.        "  "

## 2025-02-18 ENCOUNTER — CLINICAL SUPPORT (OUTPATIENT)
Dept: INTERNAL MEDICINE | Facility: CLINIC | Age: 45
End: 2025-02-18
Payer: COMMERCIAL

## 2025-02-18 DIAGNOSIS — Z23 NEED FOR VACCINATION: Primary | ICD-10-CM

## 2025-02-18 PROCEDURE — 99999 PR PBB SHADOW E&M-EST. PATIENT-LVL I: CPT | Mod: PBBFAC,,,

## 2025-02-18 NOTE — PROGRESS NOTES
#1 HPV 08/13/24  #2 HPV 10/01/24    After obtaining consent, and per orders of Dr. Eden , injection of  #3 GARDASIL Lot [id number]  Exp 10/26  given in the Left Deltoid  by Jaymie Pagan, RN, MSN    . Patient tolerated well and band aid applied. Patient instructed to remain in clinic for 15 minutes afterwards, and to report any adverse reaction to me immediately.

## 2025-05-13 NOTE — PROGRESS NOTES
"Subjective:       Patient ID: Alexander Colmenares is a 44 y.o. male with history of CAD.    Chief Complaint: Urethritis [N34.2]    Patient is new to me, PCP is Dr. Henrique Eden. Here today for the following:    History of Present Illness    CHIEF COMPLAINT:  Patient presents today with abdominal pain, urinary symptoms, and erectile dysfunction.    GENITOURINARY SYMPTOMS:  1 week ago new onset ED two times. Difficulty getting and maintaining an erection.  Over the next days, felt suprapubic pressure with urinary frequency. Feels a "tingle" within the urethra. The pressure can be painful and occasionally radiate towards his lower back.  Low volume urination.  Progressively worsening.  [facility]one [facility] and doxycycline with STI screen for GC/Chlam, syphilis, HIV.  Feels mild improvement with pressure. Urinary frequency and urgency still consistent.  Denies dysuria, incomplete emptying, difficulty initiating, stopping mid stream, hematuria, flank pain, fevers/chills, constipation, diarrhea, BRBPR.     For two months he reports new lesion on the dorsum of penis. It is erythematous without pain or pruritus. It has not changed much and is not very noticeable by patient.    New sexual partners over last few months.    FAMILY HISTORY:  Father had bladder cancer with successful treatment.    PAST MEDICAL HISTORY:  Possible sports hernia 10 years ago, not definitively diagnosed.    ROS:  General: -fever, -chills, -fatigue, -weight gain, -weight loss  Eyes: -vision changes, -redness, -discharge  ENT: -ear pain, -nasal congestion, -sore throat  Cardiovascular: -chest pain, -palpitations, -lower extremity edema  Respiratory: -cough, -shortness of breath  Gastrointestinal: -abdominal pain, -nausea, -vomiting, -diarrhea, -constipation, -blood in stool  Genitourinary: -dysuria, -hematuria, +frequency  Musculoskeletal: -joint pain, -muscle pain, +back pain  Skin: -rash, " "-lesion  Neurological: -headache, -dizziness, -numbness, +tingling  Psychiatric: -anxiety, -depression, -sleep difficulty  Male Genitourinary: +erectile dysfunction, +pelvic pressure          Current Outpatient Medications   Medication Instructions    aspirin (ECOTRIN) 81 mg, Oral, Daily    atorvastatin (LIPITOR) 80 mg, Oral, Daily    doxycycline (VIBRAMYCIN) 100 mg, 2 times daily     Objective:      Vitals:    05/14/25 0828   BP: 122/80   Pulse: 82   SpO2: 98%   Weight: 85 kg (187 lb 6.3 oz)   Height: 5' 11" (1.803 m)   PainSc:   4   PainLoc: (S) Abdomen     Body mass index is 26.14 kg/m².    Physical Exam  Vitals reviewed. Exam conducted with a chaperone present.   Constitutional:       General: He is not in acute distress.     Appearance: Normal appearance. He is not ill-appearing, toxic-appearing or diaphoretic.   HENT:      Head: Normocephalic and atraumatic.   Cardiovascular:      Rate and Rhythm: Normal rate and regular rhythm.      Pulses: Normal pulses.      Heart sounds: Normal heart sounds. No murmur heard.     No friction rub. No gallop.   Pulmonary:      Effort: Pulmonary effort is normal. No respiratory distress.      Breath sounds: Normal breath sounds. No stridor. No wheezing, rhonchi or rales.   Abdominal:      General: Abdomen is flat. Bowel sounds are normal. There is no distension.      Palpations: Abdomen is soft. There is no mass.      Tenderness: There is no abdominal tenderness. There is no right CVA tenderness, left CVA tenderness, guarding or rebound.      Hernia: No hernia is present. There is no hernia in the left inguinal area or right inguinal area.   Genitourinary:     Pubic Area: No rash.       Penis: Circumcised. Lesions present. No phimosis, paraphimosis, erythema, tenderness, discharge or swelling.       Testes: Normal. Cremasteric reflex is present.         Right: Mass, tenderness, testicular hydrocele or varicocele not present.         Left: Mass, tenderness, testicular hydrocele " or varicocele not present.      Epididymis:      Right: Normal.      Left: Normal.      Med stage (genital): 5.          Comments: No discharge from penis.  Some suprapubic tenderness.  Small raised, well circumscribed erythematous patch on dorsum of penis. Nontender to palpation and no ulceration or breakdown of skin noted. No discharge.   Musculoskeletal:      Right lower leg: No edema.      Left lower leg: No edema.   Lymphadenopathy:      Lower Body: No right inguinal adenopathy. No left inguinal adenopathy.   Skin:     General: Skin is warm.      Capillary Refill: Capillary refill takes less than 2 seconds.      Coloration: Skin is not jaundiced or pale.      Comments: Patient was slightly diaphoretic.   Neurological:      General: No focal deficit present.      Mental Status: He is alert and oriented to person, place, and time. Mental status is at baseline.      Gait: Gait normal.   Psychiatric:         Mood and Affect: Mood normal.         Behavior: Behavior normal.         Thought Content: Thought content normal.         Judgment: Judgment normal.         Assessment:       1. Urethritis    2. Urinary frequency    3. Penile lesion        IMPRESSION:  - Symptoms consistent with urethritis. Considered prostatitis, UTI, hernia, malignancy.  - Ruled out immediate need for imaging studies like cystoscopy or ultrasound.  - Decided against PSA testing due to age, inconsistent symptoms, and lack of specific prostate cancer risk factors.  - Evaluated for possible UTI, though initial urinalysis showed no significant findings.  - Assessed for potential sexually transmitted infections, awaiting results from recent STI panel.  - Considered possibility of less common sexually transmitted condition causing urethral symptoms.  - Ruled out hernias or other visible causes of symptoms.    Plan:     PLAN SUMMARY:  - Ordered urine culture and tests for less common STIs  - Continue doxycycline as prescribed by [facility]  - Continue ketoconazole cream for penile cutaneous changes  - Referred to urology for further evaluation  - Follow up with urology for appointment scheduling (patient to call office by Friday if not contacted)  - Contact office if symptoms do not improve or worsen  - Update through patient portal with any positive test results or symptom changes    Urethritis  - Documented patient reports pressure in pelvic area, urinary frequency with low volume, and a tingle sensation at the tip of the penis.  - Urine sample showed no blood, no bacteria, trace proteins, and slight dehydration.  - Ordered urine culture and additional tests for less common STIs to rule out prostatitis.  - Informed about signs requiring aggressive treatment, including significant back pain, hematuria, penile discharge, new cutaneous lesions/rashes, and pyrexia.  - Instructed patient to monitor for worsening symptoms.  -     Ambulatory referral/consult to Urology; Future; Expected date: 05/21/2025  -     Mycoplasma genitalium Molecular Detection, PCR Urine; Future; Expected date: 05/14/2025  -     Ureaplasma PCR; Future; Expected date: 05/14/2025  -     Urine Culture High Risk    Urinary frequency  -     POCT URINE DIPSTICK WITHOUT MICROSCOPE  -     Urinalysis, Reflex to Urine Culture Urine, Clean Catch  -     Ambulatory referral/consult to Urology; Future; Expected date: 05/21/2025  -     Mycoplasma genitalium Molecular Detection, PCR Urine; Future; Expected date: 05/14/2025  -     Ureaplasma PCR; Future; Expected date: 05/14/2025  -     Urine Culture High Risk    Penile lesion  - Noted patient has a dry, sometimes erythematous spot on the penis present for a few months.  - The spot is not pruritic or painful.  - Continued ketoconazole cream for these penile cutaneous changes, previously prescribed for potential fungal infection.  - Patient should try to keep the affected area clean and dry.  - Alert the clinic for any continued symptoms  or other changes.    ANTIBIOTIC USE:  - Continued doxycycline as prescribed by [facility] for prophylactic treatment.    FAMILY HISTORY OF BLADDER CANCER:  - Documented patient's father had bladder cancer, successfully treated.    FOLLOW-UP AND MONITORING:  - Referred to urology for further evaluation if symptoms persist.  - Advised to contact office if symptoms do not improve or worsen.  - Requested updates through patient portal with any positive test results or symptom changes.         This note was generated with the assistance of ambient listening technology. Verbal consent was obtained by the patient and accompanying visitor(s) for the recording of patient appointment to facilitate this note. I attest to having reviewed and edited the generated note for accuracy, though some syntax or spelling errors may persist. Please contact the author of this note for any clarification.    Jagdeep Hansen PA-C    5/14/2025

## 2025-05-14 ENCOUNTER — RESULTS FOLLOW-UP (OUTPATIENT)
Dept: INTERNAL MEDICINE | Facility: CLINIC | Age: 45
End: 2025-05-14

## 2025-05-14 ENCOUNTER — LAB VISIT (OUTPATIENT)
Dept: LAB | Facility: OTHER | Age: 45
End: 2025-05-14
Attending: COUNSELOR
Payer: COMMERCIAL

## 2025-05-14 ENCOUNTER — OFFICE VISIT (OUTPATIENT)
Dept: INTERNAL MEDICINE | Facility: CLINIC | Age: 45
End: 2025-05-14
Payer: COMMERCIAL

## 2025-05-14 VITALS
OXYGEN SATURATION: 98 % | WEIGHT: 187.38 LBS | DIASTOLIC BLOOD PRESSURE: 80 MMHG | BODY MASS INDEX: 26.23 KG/M2 | SYSTOLIC BLOOD PRESSURE: 122 MMHG | HEART RATE: 82 BPM | HEIGHT: 71 IN

## 2025-05-14 DIAGNOSIS — R35.0 URINARY FREQUENCY: ICD-10-CM

## 2025-05-14 DIAGNOSIS — N34.2 URETHRITIS: Primary | ICD-10-CM

## 2025-05-14 DIAGNOSIS — N48.9 PENILE LESION: ICD-10-CM

## 2025-05-14 DIAGNOSIS — N34.2 URETHRITIS: ICD-10-CM

## 2025-05-14 LAB
BILIRUB SERPL-MCNC: NEGATIVE MG/DL
BILIRUB UR QL STRIP.AUTO: NEGATIVE
BLOOD URINE, POC: NEGATIVE
CLARITY UR: CLEAR
CLARITY, POC UA: CLEAR
COLOR UR AUTO: YELLOW
COLOR, POC UA: NORMAL
GLUCOSE UR QL STRIP: NEGATIVE
GLUCOSE UR QL STRIP: NEGATIVE
HGB UR QL STRIP: NEGATIVE
KETONES UR QL STRIP: NEGATIVE
KETONES UR QL STRIP: NEGATIVE
LEUKOCYTE ESTERASE UR QL STRIP: NEGATIVE
LEUKOCYTE ESTERASE URINE, POC: NEGATIVE
NITRITE UR QL STRIP: NEGATIVE
NITRITE, POC UA: NEGATIVE
PH UR STRIP: 6 [PH]
PH, POC UA: 6.5
PROT UR QL STRIP: NEGATIVE
PROTEIN, POC: NORMAL
SP GR UR STRIP: 1.02
SPECIFIC GRAVITY, POC UA: 1.03
UROBILINOGEN UR STRIP-ACNC: NEGATIVE EU/DL
UROBILINOGEN, POC UA: NORMAL

## 2025-05-14 PROCEDURE — 99999 PR PBB SHADOW E&M-EST. PATIENT-LVL III: CPT | Mod: PBBFAC,,, | Performed by: COUNSELOR

## 2025-05-14 PROCEDURE — 87798 DETECT AGENT NOS DNA AMP: CPT

## 2025-05-14 PROCEDURE — 87086 URINE CULTURE/COLONY COUNT: CPT | Performed by: COUNSELOR

## 2025-05-14 PROCEDURE — 81003 URINALYSIS AUTO W/O SCOPE: CPT | Performed by: COUNSELOR

## 2025-05-14 PROCEDURE — 87563 M. GENITALIUM AMP PROBE: CPT

## 2025-05-14 RX ORDER — DOXYCYCLINE 100 MG/1
100 CAPSULE ORAL 2 TIMES DAILY
COMMUNITY
Start: 2025-05-13

## 2025-05-15 ENCOUNTER — TELEPHONE (OUTPATIENT)
Dept: UROLOGY | Facility: CLINIC | Age: 45
End: 2025-05-15
Payer: COMMERCIAL

## 2025-05-15 ENCOUNTER — PATIENT MESSAGE (OUTPATIENT)
Dept: INTERNAL MEDICINE | Facility: CLINIC | Age: 45
End: 2025-05-15
Payer: COMMERCIAL

## 2025-05-15 NOTE — TELEPHONE ENCOUNTER
"Pt update:     "   Dr. Hansne, good news and bad news, I just received negative test results from [facility] STD screening for HIV, syphilis, gonorrhea, and chlamydia.  I guess Ill schedule the referral you made to urology, as Im still feeling some pressure in pelvic area and its not clear that the antibiotics given by [facility] are going to help.  Obviously would appreciate any further thoughts you have.     Thanks,  Alexander Colmenares"   "

## 2025-05-16 ENCOUNTER — TELEPHONE (OUTPATIENT)
Dept: UROLOGY | Facility: CLINIC | Age: 45
End: 2025-05-16
Payer: COMMERCIAL

## 2025-05-16 ENCOUNTER — OFFICE VISIT (OUTPATIENT)
Dept: UROLOGY | Facility: CLINIC | Age: 45
End: 2025-05-16
Payer: COMMERCIAL

## 2025-05-16 VITALS — BODY MASS INDEX: 26.04 KG/M2 | WEIGHT: 186.75 LBS

## 2025-05-16 DIAGNOSIS — R39.9 LOWER URINARY TRACT SYMPTOMS (LUTS): Primary | ICD-10-CM

## 2025-05-16 DIAGNOSIS — M62.89 HIGH-TONE PELVIC FLOOR DYSFUNCTION: ICD-10-CM

## 2025-05-16 LAB
BACTERIA UR CULT: NO GROWTH
MYCOPLASMA GENITALIUM RESULT: NEGATIVE
SPECIMEN SOURCE: NORMAL
SPECIMEN SOURCE: NORMAL
U PARVUM DNA SPEC QL NAA+PROBE: NEGATIVE
U UREALYTICUM DNA SPEC QL NAA+PROBE: NEGATIVE

## 2025-05-16 PROCEDURE — 99999 PR PBB SHADOW E&M-EST. PATIENT-LVL III: CPT | Mod: PBBFAC,,, | Performed by: STUDENT IN AN ORGANIZED HEALTH CARE EDUCATION/TRAINING PROGRAM

## 2025-05-16 RX ORDER — HYOSCYAMINE SULFATE 0.125 MG
125 TABLET ORAL EVERY 4 HOURS PRN
Qty: 30 TABLET | Refills: 1 | Status: SHIPPED | OUTPATIENT
Start: 2025-05-16 | End: 2025-06-15

## 2025-05-16 NOTE — TELEPHONE ENCOUNTER
LVM to schedule appointment.  ----- Message from Jana Bennett MD sent at 5/16/2025  9:42 AM CDT -----  Regarding: RE: appt 5/16 request  He prefers [facility], so please call patient and offer w/ Vivi. I appreciate it.  ----- Message -----  From: Kaycee Partida, RN  Sent: 5/16/2025   8:26 AM CDT  To: Jana Bennett MD  Subject: RE: appt 5/16 request                            Good Morning,[facility] does have an opening this afternoon at 2:00, but I see he's also on your schedule. Would you like us to schedule him?Thanks,Kaycee  ----- Message -----  From: Smita Garcia RN  Sent: 5/15/2025   4:21 PM CDT  To: Kaycee Partida RN  Subject: FW: appt 5/16 request                            Would need an override.  ----- Message -----  From: Jana Bennett MD  Sent: 5/15/2025   4:14 PM CDT  To: Susan Trinidad Staff; Andres Coleman Staff; And#  Subject: appt 5/16 request                                Hello, Patient would like to be seen Friday 5/16 for LUTS. Is there availability at [facility]?Thanks in advance, Dr. Bennett, Urology [facility]

## 2025-05-16 NOTE — PROGRESS NOTES
Patient ID: Alexander Colmenares is a 44 y.o. male.    Chief Complaint: Bothersome urinary symptoms   Referral: Henrique Eden MD  [id number]  [id number]  [place] [address]     HPI  44 y.o. who presents to the Urology clinic for evaluation of LUTS, urgency, frequency, pelvic pressure for about 1 week. Patient w/ increased abdominal physical activity/ ab workouts recently. Patient does not lift heavy wegihts. No concerns for STI, recent negative testing. Denies hematuria, hx of UTIs, prostate infections. Patient is on doxycycline without improvement of symptoms. Patient drinks 1 caffeinated beverage daily. Denies constipation. Drinks fair amount of water.   Medically Necessary ROS documented in HPI    Past Medical History  Active Ambulatory Problems     Diagnosis Date Noted    Neck pain on left side 10/29/2018    Poor posture 11/13/2018    Stiffness of cervical spine 11/13/2018    Family history of bicuspid aortic valve 05/31/2021    Family history of bladder cancer 05/31/2021    Snoring     Coronary artery disease involving native coronary artery of native heart with angina pectoris 06/22/2023    Agatston coronary artery calcium score between 200 and 399 06/22/2023     Resolved Ambulatory Problems     Diagnosis Date Noted    No Resolved Ambulatory Problems     Past Medical History:   Diagnosis Date    Hyperlipidemia     Hypertension     Seasonal allergies     Sleep apnea          Past Surgical History  Past Surgical History:   Procedure Laterality Date    LEFT HEART CATHETERIZATION Left 06/27/2023    Procedure: Left heart cath;  Surgeon: Vincent Goff MD;  Location: [facility] CATH LAB;  Service: Cardiology;  Laterality: Left;  12pm start, R rad access  RN PREOP 6/23/2023    WISDOM TOOTH EXTRACTION         Social History       Medications  Current Medications[1]    Allergies  Review of patient's allergies indicates:  No Known Allergies    Patient's PMH, FH, Social hx, Medications, allergies  reviewed and updated as pertinent to today's visit    Objective:      Physical Exam  Constitutional:       Appearance: He is well-developed.   HENT:      Head: Normocephalic and atraumatic.   Eyes:      Conjunctiva/sclera: Conjunctivae normal.   Pulmonary:      Effort: Pulmonary effort is normal. No respiratory distress.   Abdominal:      General: There is no distension.      Palpations: Abdomen is soft. There is no mass.      Tenderness: There is no abdominal tenderness. There is no guarding.   Genitourinary:     Prostate: Normal. Not enlarged and not tender.      Rectum: No tenderness. Abnormal anal tone.      Comments: No pelvic floor tenderness  Prostate not tender or enlarged or asymmetric  High pelvic tone at level anus  Skin:     General: Skin is warm.      Findings: No rash.   Neurological:      Mental Status: He is alert and oriented to person, place, and time.   Psychiatric:         Behavior: Behavior normal.             Assessment:       1. Lower urinary tract symptoms (LUTS)    2. High-tone pelvic floor dysfunction        Plan:         Pending atypical bacteria screening  Discussed impact of exercise on pelvic floor and how it can manifest as LUTS, reduce caffeine intake during this time, consider food diary  Levsin provided to aid in urinary discomfort and promote pelvic floor relaxation, cautioned patient re: sedating effects of levsin, short course anticipated  PVR 90 cc w/o evidence of retention  UA not concerning for infection  5/14/2025 urine culture negative for UTI   Recent STI panel negative  FU in 2 weeks or sooner if needed         [1]   Current Outpatient Medications:     aspirin (ECOTRIN) 81 MG EC tablet, Take 1 tablet (81 mg total) by mouth once daily., Disp: 90 tablet, Rfl: 3    atorvastatin (LIPITOR) 80 MG tablet, Take 1 tablet (80 mg total) by mouth once daily., Disp: 90 tablet, Rfl: 3    doxycycline (VIBRAMYCIN) 100 MG Cap, Take 100 mg by mouth 2 (two) times daily., Disp: , Rfl:      hyoscyamine (ANASPAZ,LEVSIN) 0.125 mg Tab, Take 1 tablet (125 mcg total) by mouth every 4 (four) hours as needed (urinary symptoms)., Disp: 30 tablet, Rfl: 1    Current Facility-Administered Medications:     sodium chloride 0.9% flush 10 mL, 10 mL, Intravenous, PRN, Vincent Goff MD

## 2025-06-02 ENCOUNTER — TELEPHONE (OUTPATIENT)
Dept: UROLOGY | Facility: CLINIC | Age: 45
End: 2025-06-02
Payer: COMMERCIAL

## 2025-07-01 ENCOUNTER — TELEPHONE (OUTPATIENT)
Dept: PHARMACY | Facility: CLINIC | Age: 45
End: 2025-07-01
Payer: COMMERCIAL

## 2025-07-01 NOTE — TELEPHONE ENCOUNTER
[facility]Population [facility] Chart Review & Patient Outreach Details For Medication Adherence Project    Reason for Outreach Encounter: 3rd Party payor non-compliance report ([employer], [employer], C, etc)  2.  Patient Outreach Method: Reviewed patient chart   3.   Medication in question:    Hyperlipidemia Medications              atorvastatin (LIPITOR) 80 MG tablet Take 1 tablet (80 mg total) by mouth once daily.                  atorvastatin   last filled  6/13/25 for 90 day supply    4.  Reviewed and or Updates Made To: Patient Chart  5. Outreach Outcomes and/or actions taken: Patient filled medication and is on track to be adherent  Additional Notes:

## 2025-08-03 ENCOUNTER — TELEPHONE (OUTPATIENT)
Dept: PHARMACY | Facility: CLINIC | Age: 45
End: 2025-08-03
Payer: COMMERCIAL

## 2025-08-04 NOTE — TELEPHONE ENCOUNTER
[facility]Population [facility] Chart Review & Patient Outreach Details For Medication Adherence Project    Reason for Outreach Encounter: 3rd Party payor non-compliance report ([employer], [employer], [employer], etc)  2.  Patient Outreach Method: Reviewed patient chart   3.   Medication in question:    Hyperlipidemia Medications              atorvastatin (LIPITOR) 80 MG tablet Take 1 tablet (80 mg total) by mouth once daily.                  LF 90 ds 6/13/25    4.  Reviewed and or Updates Made To: Patient Chart  5. Outreach Outcomes and/or actions taken: Patient filled medication and is on track to be adherent  Additional Notes:

## 2025-08-07 ENCOUNTER — APPOINTMENT (OUTPATIENT)
Dept: LAB | Facility: OTHER | Age: 45
End: 2025-08-07
Attending: INTERNAL MEDICINE
Payer: COMMERCIAL

## 2025-08-07 ENCOUNTER — PATIENT MESSAGE (OUTPATIENT)
Dept: INTERNAL MEDICINE | Facility: CLINIC | Age: 45
End: 2025-08-07

## 2025-08-07 ENCOUNTER — OFFICE VISIT (OUTPATIENT)
Dept: INTERNAL MEDICINE | Facility: CLINIC | Age: 45
End: 2025-08-07
Attending: INTERNAL MEDICINE
Payer: COMMERCIAL

## 2025-08-07 VITALS
BODY MASS INDEX: 25.96 KG/M2 | HEART RATE: 82 BPM | DIASTOLIC BLOOD PRESSURE: 80 MMHG | OXYGEN SATURATION: 98 % | SYSTOLIC BLOOD PRESSURE: 118 MMHG | WEIGHT: 185.44 LBS | HEIGHT: 71 IN

## 2025-08-07 DIAGNOSIS — Z12.11 COLON CANCER SCREENING: ICD-10-CM

## 2025-08-07 DIAGNOSIS — H01.009 BLEPHARITIS, UNSPECIFIED LATERALITY, UNSPECIFIED TYPE: ICD-10-CM

## 2025-08-07 DIAGNOSIS — N48.1 BALANITIS: ICD-10-CM

## 2025-08-07 DIAGNOSIS — I25.119 CORONARY ARTERY DISEASE INVOLVING NATIVE CORONARY ARTERY OF NATIVE HEART WITH ANGINA PECTORIS: ICD-10-CM

## 2025-08-07 DIAGNOSIS — Z00.00 ANNUAL PHYSICAL EXAM: Primary | ICD-10-CM

## 2025-08-07 PROCEDURE — 1159F MED LIST DOCD IN RCRD: CPT | Mod: CPTII,S$GLB,, | Performed by: INTERNAL MEDICINE

## 2025-08-07 PROCEDURE — 3079F DIAST BP 80-89 MM HG: CPT | Mod: CPTII,S$GLB,, | Performed by: INTERNAL MEDICINE

## 2025-08-07 PROCEDURE — 1160F RVW MEDS BY RX/DR IN RCRD: CPT | Mod: CPTII,S$GLB,, | Performed by: INTERNAL MEDICINE

## 2025-08-07 PROCEDURE — 99999 PR PBB SHADOW E&M-EST. PATIENT-LVL V: CPT | Mod: PBBFAC,,, | Performed by: INTERNAL MEDICINE

## 2025-08-07 PROCEDURE — 99396 PREV VISIT EST AGE 40-64: CPT | Mod: S$GLB,,, | Performed by: INTERNAL MEDICINE

## 2025-08-07 PROCEDURE — 3008F BODY MASS INDEX DOCD: CPT | Mod: CPTII,S$GLB,, | Performed by: INTERNAL MEDICINE

## 2025-08-07 PROCEDURE — 3074F SYST BP LT 130 MM HG: CPT | Mod: CPTII,S$GLB,, | Performed by: INTERNAL MEDICINE

## 2025-08-07 RX ORDER — TRIAMCINOLONE ACETONIDE 1 MG/G
CREAM TOPICAL 2 TIMES DAILY
Qty: 15 G | Refills: 0 | Status: SHIPPED | OUTPATIENT
Start: 2025-08-07

## 2025-08-07 NOTE — PROGRESS NOTES
"Subjective:       Patient ID: Alexander Colmenares is a 44 y.o. male.    Chief Complaint: Annual Exam    Here for annual exam    Rash on distal shaft and glans of penis the past 4+ months.  It waxes and wanes without complete resolution.  He has tried b.i.d. antifungal for more than 2 weeks in b.i.d. hydrocortisone for greater than 2 weeks without any significant change in rash.  He has abstained from intercourse this has been the only thing that is allowed for some improvement, but again no complete resolution.  [facility] with ceftriaxone injection and doxycycline.       ### CAD ###  High Ca++ score (concentrated in prox LAD) with atyp sxs.  Cath Dominance: Right;LM: normal; LAD: prox; 20%L Cx: normal: prox 20%             LDLCALC                  72.4                08/13/2024 08:52 AM                     History of Present Illness              Review of Systems   Constitutional:  Negative for appetite change, chills, fever and unexpected weight change.   HENT:  Negative for hearing loss, sore throat and trouble swallowing.    Eyes:  Negative for visual disturbance.   Respiratory:  Negative for cough, chest tightness and shortness of breath.    Cardiovascular:  Negative for chest pain and leg swelling.   Gastrointestinal:  Negative for abdominal pain, blood in stool, constipation, diarrhea, nausea and vomiting.   Endocrine: Negative for polydipsia and polyuria.   Genitourinary:  Negative for decreased urine volume, difficulty urinating, dysuria, frequency and urgency.   Musculoskeletal:  Negative for gait problem.   Skin:  Negative for rash.   Neurological:  Negative for dizziness and numbness.   Psychiatric/Behavioral:  The patient is not nervous/anxious.        Objective:      Vitals:    08/07/25 0751   BP: 118/80   BP Location: Right arm   Patient Position: Sitting   Pulse: 82   SpO2: 98%   Weight: 84.1 kg (185 lb 6.5 oz)   Height: 5' 11" (1.803 m) "      Physical Exam  Vitals and nursing note reviewed.   Constitutional:       General: He is not in acute distress.     Appearance: Normal appearance. He is well-developed.   HENT:      Head: Normocephalic and atraumatic.      Mouth/Throat:      Pharynx: No oropharyngeal exudate.   Eyes:      General: No scleral icterus.     Conjunctiva/sclera: Conjunctivae normal.      Pupils: Pupils are equal, round, and reactive to light.   Neck:      Thyroid: No thyromegaly.   Cardiovascular:      Rate and Rhythm: Normal rate and regular rhythm.      Heart sounds: Normal heart sounds. No murmur heard.  Pulmonary:      Effort: Pulmonary effort is normal.      Breath sounds: Normal breath sounds. No wheezing or rales.   Abdominal:      General: There is no distension.   Musculoskeletal:         General: No tenderness.   Lymphadenopathy:      Cervical: No cervical adenopathy.   Skin:     General: Skin is warm and dry.   Neurological:      Mental Status: He is alert and oriented to person, place, and time.   Psychiatric:         Behavior: Behavior normal.         Assessment:       1. Annual physical exam    2. Blepharitis, unspecified laterality, unspecified type    3. Balanitis    4. Coronary artery disease involving native coronary artery of native heart with angina pectoris    5. Colon cancer screening        Plan:       Alexander was seen today for annual exam.    Diagnoses and all orders for this visit:    Annual physical exam  -     Lipid Panel; Future  -     Comprehensive Metabolic Panel; Future  -     TSH; Future  -     CBC Auto Differential; Future  -     Hemoglobin A1C; Future    Blepharitis, unspecified laterality, unspecified type  -     Ambulatory referral/consult to Ophthalmology; Future    Balanitis   I would resume topical steroid  -     Ambulatory referral/consult to Dermatology; Future    Coronary artery disease involving native coronary artery of native heart with angina pectoris   Controlled and asymptomatic.   Continue current Rx regimen.    Colon cancer screening  -     Ambulatory referral/consult to Endo Procedure ; Future       Visit today is associated with current or anticipated ongoing medical care related to this patient's single serious condition/complex condition of CAD. The patient will return to see me as these issues will be followed longitudinally.      Assessment & Plan              This note was generated with the assistance of ambient listening technology. Verbal consent was obtained by the patient and accompanying visitor(s) for the recording of patient appointment to facilitate this note. I attest to having reviewed and edited the generated note for accuracy, though some syntax or spelling errors may persist. Please contact the author of this note for any clarification.      Henrique Mancera MD  Internal Medicine-[facility]        Side effects of medication(s) were discussed in detail and patient voiced understanding.  Patient will call back for any issues or complications.

## 2025-08-19 ENCOUNTER — TELEPHONE (OUTPATIENT)
Dept: OPTOMETRY | Facility: CLINIC | Age: 45
End: 2025-08-19
Payer: COMMERCIAL

## 2025-08-26 ENCOUNTER — OFFICE VISIT (OUTPATIENT)
Dept: DERMATOLOGY | Facility: CLINIC | Age: 45
End: 2025-08-26
Payer: COMMERCIAL

## 2025-08-26 DIAGNOSIS — L27.1 FIXED DRUG ERUPTION: Primary | ICD-10-CM

## 2025-08-26 PROCEDURE — 1160F RVW MEDS BY RX/DR IN RCRD: CPT | Mod: CPTII,S$GLB,, | Performed by: STUDENT IN AN ORGANIZED HEALTH CARE EDUCATION/TRAINING PROGRAM

## 2025-08-26 PROCEDURE — 3044F HG A1C LEVEL LT 7.0%: CPT | Mod: CPTII,S$GLB,, | Performed by: STUDENT IN AN ORGANIZED HEALTH CARE EDUCATION/TRAINING PROGRAM

## 2025-08-26 PROCEDURE — 99999 PR PBB SHADOW E&M-EST. PATIENT-LVL III: CPT | Mod: PBBFAC,,, | Performed by: STUDENT IN AN ORGANIZED HEALTH CARE EDUCATION/TRAINING PROGRAM

## 2025-08-26 PROCEDURE — 1159F MED LIST DOCD IN RCRD: CPT | Mod: CPTII,S$GLB,, | Performed by: STUDENT IN AN ORGANIZED HEALTH CARE EDUCATION/TRAINING PROGRAM

## 2025-08-26 PROCEDURE — 99203 OFFICE O/P NEW LOW 30 MIN: CPT | Mod: S$GLB,,, | Performed by: STUDENT IN AN ORGANIZED HEALTH CARE EDUCATION/TRAINING PROGRAM

## 2025-08-26 RX ORDER — CLOTRIMAZOLE AND BETAMETHASONE DIPROPIONATE 10; .5 MG/ML; MG/ML
LOTION TOPICAL 2 TIMES DAILY
COMMUNITY

## 2025-08-26 RX ORDER — DESONIDE 0.5 MG/G
OINTMENT TOPICAL
Qty: 60 G | Refills: 3 | Status: SHIPPED | OUTPATIENT
Start: 2025-08-26

## (undated) DEVICE — PACK CATH LAB

## (undated) DEVICE — HEMOSTAT VASC BAND REG 24CM

## (undated) DEVICE — KIT GLIDESHEATH SLEND 6FR 10CM

## (undated) DEVICE — PAD DEFIB CADENCE ADULT R2

## (undated) DEVICE — PAD RADI FEMORAL

## (undated) DEVICE — OMNIPAQUE CONTRAST 350MG/100ML

## (undated) DEVICE — KIT MANIFOLD LOW PRESS TUBING

## (undated) DEVICE — GUIDEWIRE SAFE T J TIP 145X2.5

## (undated) DEVICE — CATH JACKY RADIAL 3.5 110CM

## (undated) DEVICE — KIT HAND CONTROL HIGH PRESSUR

## (undated) DEVICE — KIT SYR REUSABLE